# Patient Record
Sex: MALE | Race: WHITE | NOT HISPANIC OR LATINO | Employment: FULL TIME | URBAN - METROPOLITAN AREA
[De-identification: names, ages, dates, MRNs, and addresses within clinical notes are randomized per-mention and may not be internally consistent; named-entity substitution may affect disease eponyms.]

---

## 2018-02-13 ENCOUNTER — OFFICE VISIT (OUTPATIENT)
Dept: MEDICAL GROUP | Facility: MEDICAL CENTER | Age: 23
End: 2018-02-13
Payer: COMMERCIAL

## 2018-02-13 VITALS
HEIGHT: 68 IN | DIASTOLIC BLOOD PRESSURE: 74 MMHG | TEMPERATURE: 98.2 F | WEIGHT: 221 LBS | OXYGEN SATURATION: 100 % | SYSTOLIC BLOOD PRESSURE: 130 MMHG | HEART RATE: 45 BPM | BODY MASS INDEX: 33.49 KG/M2

## 2018-02-13 DIAGNOSIS — Z13.6 ENCOUNTER FOR LIPID SCREENING FOR CARDIOVASCULAR DISEASE: ICD-10-CM

## 2018-02-13 DIAGNOSIS — Z13.220 ENCOUNTER FOR LIPID SCREENING FOR CARDIOVASCULAR DISEASE: ICD-10-CM

## 2018-02-13 DIAGNOSIS — F41.1 GENERALIZED ANXIETY DISORDER: ICD-10-CM

## 2018-02-13 DIAGNOSIS — G40.409 MYOCLONUS EPILEPSY (HCC): ICD-10-CM

## 2018-02-13 DIAGNOSIS — R03.0 ELEVATED BP WITHOUT DIAGNOSIS OF HYPERTENSION: ICD-10-CM

## 2018-02-13 DIAGNOSIS — Z13.1 DIABETES MELLITUS SCREENING: ICD-10-CM

## 2018-02-13 DIAGNOSIS — Z76.89 ENCOUNTER TO ESTABLISH CARE WITH NEW DOCTOR: ICD-10-CM

## 2018-02-13 PROCEDURE — 99204 OFFICE O/P NEW MOD 45 MIN: CPT | Performed by: FAMILY MEDICINE

## 2018-02-13 RX ORDER — BACLOFEN 10 MG/1
10 TABLET ORAL 3 TIMES DAILY
Qty: 90 TAB | COMMUNITY
Start: 2018-02-13 | End: 2018-07-30

## 2018-02-13 ASSESSMENT — PATIENT HEALTH QUESTIONNAIRE - PHQ9: CLINICAL INTERPRETATION OF PHQ2 SCORE: 0

## 2018-02-13 NOTE — LETTER
"[employer] [facility]  Juve Portillo M.D.  [address]  [place] [address]  Fax: [phone]   Authorization for Release/Disclosure of   Protected Health Information   Name: YOAN SIDDIQI : 1995 SSN: [id number]   Address: [address] [place] [address]  [place] [address] Phone:    [phone] (home)    I authorize the entity listed below to release/disclose the PHI below to:   [facility]/Juve Portillo M.D. and Juve Portillo M.D.   Provider or Entity Name:  Dr. Mauro Montesinos   [facility]   Phone:  [phone]    Fax:     Reason for request: continuity of care   Information to be released:    [  ] LAST COLONOSCOPY,  including any PATH REPORT and follow-up  [  ] LAST FIT/COLOGUARD RESULT [  ] LAST DEXA  [  ] LAST MAMMOGRAM  [  ] LAST PAP  [  ] LAST LABS [  ] RETINA EXAM REPORT  [ X ] IMMUNIZATION RECORDS  [  ] Release all info      [  ] Check here and initial the line next to each item to release ALL health information INCLUDING  _____ Care and treatment for drug and / or alcohol abuse  _____ HIV testing, infection status, or AIDS  _____ Genetic Testing    DATES OF SERVICE OR TIME PERIOD TO BE DISCLOSED: _____________  I understand and acknowledge that:  * This Authorization may be revoked at any time by you in writing, except if your health information has already been used or disclosed.  * Your health information that will be used or disclosed as a result of you signing this authorization could be re-disclosed by the recipient. If this occurs, your re-disclosed health information may no longer be protected by State or Federal laws.  * You may refuse to sign this Authorization. Your refusal will not affect your ability to obtain treatment.  * This Authorization becomes effective upon signing and will  on (date) __________.      If no date is indicated, this Authorization will  one (1) year from the signature date.    Name: Yoan Lewis  Mike    Signature:   Date:     2/13/2018       PLEASE FAX REQUESTED RECORDS BACK TO: [phone]

## 2018-02-13 NOTE — ASSESSMENT & PLAN NOTE
Chronic, stable, well controlled off medication. Patient states that he has taken Wellbutrin in the past, and the last time that he saw psychiatrist was at the age of 9 years old. Patient does state that he has mild anxiety issues, however that these are generally well-controlled with stress relieving techniques.

## 2018-02-13 NOTE — PROGRESS NOTES
Subjective:   Chief Complaint/History of Present Illness:  Jaylen Mckeon is a 22 y.o. male patient new to [facility] who presents today to establish primary medical care and to discuss myoclonus epilepsy, GLADYS, elevated BP.  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 Patient states that this was preceded diagnosed by a neurologist. However, he is not clear whether this was by his neurologist at [place] (Dr. Lux) or a neurologist here in [place], Dr. Parker.    Patient states that episodes are infrequent, last one occurring more than 2 years ago, and that while they did trial him on his eyes, this caused him to have convulsions. It is unclear whether this was a worsening of his myoclonic episode secondary to a lowering of the seizure threshold with Xanax, versus a true medication allergy. In any case, we have updated his allergies at present time.    Furthermore, patient states that he was last prescribed baclofen by Dr. Parker, in order to address myalgias due to the myoclonic episodes.    ROS is NEGATIVE for confusion, altered mentation, word finding difficulty, memory loss, diplopia, visual scotomas, facial droop, dysarthria, dysphagia, hemiplegia, gait instability, new/abnormal paresthesias/numbness.    Generalized anxiety disorder  Chronic, stable, well controlled off medication. Patient states that he has taken Wellbutrin in the past, and the last time that he saw psychiatrist was at the age of 9 years old. Patient does state that he has mild anxiety issues, however that these are generally well-controlled with stress relieving techniques.    Elevated BP without diagnosis of hypertension  Patient is noted to have elevated blood pressure reading today in clinic with a systolic reading of 130 mmHg. Interestingly, patient has a really good/low pulse rate at 45,  without any syncopal presyncopal symptoms, dizziness, vertigo.    ROS is NEGATIVE for dizziness, generalized weakness/fatigue, cold sweats, dizziness,  vision/hearing changes, jaw pain/paresthesias, BUE pain/paresthesias/numbness/weakness, chest pain/pressure, palpitations, dyspnea, nausea, RUQ abdominal pain, oliguria/anuria, BLE edema.     ROS is NEGATIVE for confusion, altered mentation, word finding difficulty, memory loss, diplopia, visual scotomas, facial droop, dysarthria, dysphagia, hemiplegia, gait instability, new/abnormal paresthesias/numbness.      Patient Active Problem List    Diagnosis Date Noted   • Myoclonus epilepsy (CMS-HCC) 02/13/2018   • Generalized anxiety disorder 02/13/2018   • Elevated BP without diagnosis of hypertension 02/13/2018       Additional History:   Allergies:    Lorazepam and Amoxicillin-pot clavulanate     Medications:     Current Outpatient Prescriptions Ordered in [facility]   Medication Sig Dispense Refill   • baclofen (LIORESAL) 10 MG Tab Take 1 Tab by mouth 3 times a day. 90 Tab      No current Epic-ordered facility-administered medications on file.         Past Medical History:   No past medical history on file.     Past Surgical History:     Past Surgical History:   Procedure Laterality Date   • ORCHIOPEXY ADULT Bilateral     Testicular torsions        Social History:     Social History   Substance Use Topics   • Smoking status: Never Smoker   • Smokeless tobacco: Never Used   • Alcohol use No        Family History:     No family status information on file.      No family history on file.    ROS:     - Constitutional: Negative for fever, chills, unexpected weight change, and fatigue/generalized weakness.     - HEENT: Negative for headaches, vision changes, hearing changes, ear pain, ear discharge, rhinorrhea, sinus congestion, sore throat, and neck pain.      - Respiratory: Negative for cough, sputum production, chest congestion, dyspnea, wheezing, and crackles.      -  "Cardiovascular: Negative for chest pain, palpitations, orthopnea, and bilateral lower extremity edema.     - Gastrointestinal: Negative for heartburn, nausea, vomiting, abdominal pain, hematochezia, melena, diarrhea, constipation, and greasy/foul-smelling stools.     - Genitourinary: Negative for dysuria, polyuria, hematuria, pyuria, urinary urgency, and urinary incontinence.    - Musculoskeletal: Negative for myalgias, back pain, and joint pain.     - NOTE: All other systems reviewed and are negative, except as in HPI.     Objective:   Physical Exam:    Vitals: Blood pressure 130/74, pulse (!) 45, temperature 36.8 °C (98.2 °F), height 1.727 m (5' 8\"), weight 100.2 kg (221 lb), SpO2 100 %.   BMI: Body mass index is 33.6 kg/m².   General/Constitutional: Vitals as above, Well nourished, well developed male in no acute distress   Head/Eyes:  - Head is grossly normal & atraumatic  - Bilateral conjunctivae clear and not injected, bilateral EOMI, bilateral PERRL   ENT:   - Bilateral external ears grossly normal in appearance, external auditory canals clear & bilateral TMs visualized with appropriate cone of light reflex, hearing grossly intact  - External nares normal in appearance and without discharge/bleeding, bilateral turbinates non-erythematous/non-edematous and without discharge/bleeding  - Good dentition posterior oropharynx without erythema/edema/exudates  Neck: Neck supple, no masses, neck non-tender to palpation, no thyromegaly/goiter   Respiratory: No respiratory distress, bilateral lungs are clear to auscultation in all lung fields (anterior/lateral/posterior), no wheezing/rhonchi/rales   Cardiovascular: Regular rate and rhythm without murmur/gallops/rubs, distal pulses equal and 2+ bilaterally (radial, posterior tibial), no bilateral lower extremity edema   Gastrointestinal: Abdomen resonant to percussion, Bowel sounds present in all 4 quadrants, abdomen non-tender palpation    MSK: Gait grossly normal & not " antalgic, no tenderness to percussion of vertebral processes, no CVAT, no bilateral SI joint tenderness   Integumentary: No apparent rashes   Neuro: Gross motor movement intact in all 4 extremities, Gross sensation intact to extremities and trunk, Gait grossly normal and not antalgic   Psych: Judgment grossly appropriate, no apparent depression/anxiety    Health Maintenance:     - Not addressed at this visit    Imaging/Labs:     - Not addressed at this visit    Assessment and Plan:   1. Encounter to establish care with new doctor  New patient, in good health, apart from #2 & #3.   - HEMOGLOBIN A1C; Future   - COMP METABOLIC PANEL; Future   - LIPID PROFILE; Future    2. Myoclonus epilepsy (CMS-HCC)  Chronic, stable, well-controlled.  Baclofen When necessary for less myoclonic episode myalgias.   - baclofen (LIORESAL) 10 MG Tab; Take 1 Tab by mouth 3 times a day.  Dispense: 90 Tab   - COMP METABOLIC PANEL; Future    3. Generalized anxiety disorder  Chronic, stable, well controlled on stress relieving techniques.   - COMP METABOLIC PANEL; Future    4. Elevated BP without diagnosis of hypertension  Acute, new problem, patient to return to weeks to evaluate if this is actually a diagnosis of pre-hypertension versus hypertension. In the meantime, patient to have lab evaluation for possible comorbidities.   - HEMOGLOBIN A1C; Future   - COMP METABOLIC PANEL; Future   - LIPID PROFILE; Future    5. Encounter for lipid screening for cardiovascular disease  6. Diabetes mellitus screening   - HEMOGLOBIN A1C; Future   - COMP METABOLIC PANEL; Future   - LIPID PROFILE; Future    RTC: in 2 weeks for blood pressure management, lab review.    PLEASE NOTE: This dictation was created using voice recognition software. I have made every reasonable attempt to correct obvious errors, but I expect that there are errors of grammar and possibly content that I did not discover before finalizing the note.

## 2018-02-13 NOTE — ASSESSMENT & PLAN NOTE
Patient with history of myoclonic epilepsy, without loss of consciousness. Patient states that this was preceded diagnosed by a neurologist. However, he is not clear whether this was by his neurologist at [place] (Dr. Lxu) or a neurologist here in [place], Dr. Parker.    Patient states that episodes are infrequent, last one occurring more than 2 years ago, and that while they did trial him on his eyes, this caused him to have convulsions. It is unclear whether this was a worsening of his myoclonic episode secondary to a lowering of the seizure threshold with Xanax, versus a true medication allergy. In any case, we have updated his allergies at present time.    Furthermore, patient states that he was last prescribed baclofen by Dr. Parker, in order to address myalgias due to the myoclonic episodes.    ROS is NEGATIVE for confusion, altered mentation, word finding difficulty, memory loss, diplopia, visual scotomas, facial droop, dysarthria, dysphagia, hemiplegia, gait instability, new/abnormal paresthesias/numbness.

## 2018-02-13 NOTE — LETTER
[facility]  Juve Portillo M.D.  [address]  [place] [address]  Fax: [phone]   Authorization for Release/Disclosure of   Protected Health Information   Name: YOAN SIDDIQI : 1995 SSN: [id number]   Address: [address] [place] [address]  [place] [address] Phone:    [phone] (home)    I authorize the entity listed below to release/disclose the PHI below to:   [facility]/Juve Portillo M.D. and Juve Portillo M.D.   Provider or Entity Name:  Jennifer Jose J ([facility])   Address   City, State, [facility]   Phone:      Fax:     Reason for request: continuity of care   Information to be released:    [  ] LAST COLONOSCOPY,  including any PATH REPORT and follow-up  [  ] LAST FIT/COLOGUARD RESULT [  ] LAST DEXA  [  ] LAST MAMMOGRAM  [  ] LAST PAP  [  ] LAST LABS [  ] RETINA EXAM REPORT  [  ] IMMUNIZATION RECORDS  [  ] Release all info      [  ] Check here and initial the line next to each item to release ALL health information INCLUDING  _____ Care and treatment for drug and / or alcohol abuse  _____ HIV testing, infection status, or AIDS  _____ Genetic Testing    DATES OF SERVICE OR TIME PERIOD TO BE DISCLOSED: _____________  I understand and acknowledge that:  * This Authorization may be revoked at any time by you in writing, except if your health information has already been used or disclosed.  * Your health information that will be used or disclosed as a result of you signing this authorization could be re-disclosed by the recipient. If this occurs, your re-disclosed health information may no longer be protected by State or Federal laws.  * You may refuse to sign this Authorization. Your refusal will not affect your ability to obtain treatment.  * This Authorization becomes effective upon signing and will  on (date) __________.      If no date is indicated, this Authorization will  one (1) year from the signature date.    Name: Yoan Siddiqi    Signature:   Date:     2/13/2018       PLEASE FAX REQUESTED RECORDS BACK TO: [phone]

## 2018-02-13 NOTE — ASSESSMENT & PLAN NOTE
Patient is noted to have elevated blood pressure reading today in clinic with a systolic reading of 130 mmHg. Interestingly, patient has a really good/low pulse rate at 45, without any syncopal presyncopal symptoms, dizziness, vertigo.    ROS is NEGATIVE for dizziness, generalized weakness/fatigue, cold sweats, dizziness,  vision/hearing changes, jaw pain/paresthesias, BUE pain/paresthesias/numbness/weakness, chest pain/pressure, palpitations, dyspnea, nausea, RUQ abdominal pain, oliguria/anuria, BLE edema.     ROS is NEGATIVE for confusion, altered mentation, word finding difficulty, memory loss, diplopia, visual scotomas, facial droop, dysarthria, dysphagia, hemiplegia, gait instability, new/abnormal paresthesias/numbness.

## 2018-02-16 ENCOUNTER — APPOINTMENT (OUTPATIENT)
Dept: MEDICAL GROUP | Facility: MEDICAL CENTER | Age: 23
End: 2018-02-16
Payer: COMMERCIAL

## 2018-02-20 ENCOUNTER — APPOINTMENT (OUTPATIENT)
Dept: MEDICAL GROUP | Facility: MEDICAL CENTER | Age: 23
End: 2018-02-20

## 2018-02-21 ENCOUNTER — NON-PROVIDER VISIT (OUTPATIENT)
Dept: MEDICAL GROUP | Facility: MEDICAL CENTER | Age: 23
End: 2018-02-21
Payer: COMMERCIAL

## 2018-02-21 DIAGNOSIS — Z11.1 VISIT FOR TB SKIN TEST: ICD-10-CM

## 2018-02-21 DIAGNOSIS — Z23 NEED FOR TDAP VACCINATION: ICD-10-CM

## 2018-02-22 ENCOUNTER — TELEPHONE (OUTPATIENT)
Dept: MEDICAL GROUP | Facility: MEDICAL CENTER | Age: 23
End: 2018-02-22

## 2018-02-22 PROCEDURE — 86580 TB INTRADERMAL TEST: CPT | Performed by: FAMILY MEDICINE

## 2018-02-22 PROCEDURE — 90715 TDAP VACCINE 7 YRS/> IM: CPT | Performed by: FAMILY MEDICINE

## 2018-02-22 PROCEDURE — 90471 IMMUNIZATION ADMIN: CPT | Performed by: FAMILY MEDICINE

## 2018-02-22 NOTE — TELEPHONE ENCOUNTER
Pt was told that he can go to any  to get his TB skin test read since he got his PPD placed on Wednesday 02/21/18 at 5:10pm. Pt's TB form is scanned in [employer]. Please do not forget to enter the result on the chart.

## 2018-02-23 ENCOUNTER — TELEPHONE (OUTPATIENT)
Dept: MEDICAL GROUP | Facility: MEDICAL CENTER | Age: 23
End: 2018-02-23

## 2018-02-23 NOTE — TELEPHONE ENCOUNTER
ESTABLISHED PATIENT PRE-VISIT PLANNING     Note: Patient will not be contacted if there is no indication to call.     1.  Reviewed notes from the last few office visits within the medical group: Yes 02/13/2018    2.  If any orders were placed at last visit or intended to be done for this visit (i.e. 6 mos follow-up), do we have Results/Consult Notes?        •  Labs - Labs ordered, NOT completed. Patient advised to complete prior to next appointment.   Note: If patient appointment is for lab review and patient did not complete labs, check with provider if OK to reschedule patient until labs completed.       •  Imaging - Imaging was not ordered at last office visit.       •  Referrals - No referrals were ordered at last office visit.    3. Is this appointment scheduled as a Hospital Follow-Up? No    4.  Immunizations were updated in [employer] using WebIZ?: Yes       •  Web [employer] Recommendations: FLU, MMR  and VARICELLA (Chicken Pox)     5.  Patient is due for the following Health Maintenance Topics:   Health Maintenance Due   Topic Date Due   • IMM HEP B VACCINE (1 of 3 - Primary Series) 1995   • IMM HEP A VACCINE (1 of 2 - Standard Series) 12/10/1995   • IMM VARICELLA (CHICKENPOX) VACCINE (1 of 2 - 2 Dose Adolescent Series) 12/10/2007   • IMM INFLUENZA (1) 09/01/2017         6.  Patient was NOT informed to arrive 15 min prior to their scheduled appointment and bring in their medication bottles.

## 2018-02-24 ENCOUNTER — HOSPITAL ENCOUNTER (OUTPATIENT)
Dept: LAB | Facility: MEDICAL CENTER | Age: 23
End: 2018-02-24
Attending: FAMILY MEDICINE
Payer: COMMERCIAL

## 2018-02-24 DIAGNOSIS — G40.409 MYOCLONUS EPILEPSY (HCC): ICD-10-CM

## 2018-02-24 DIAGNOSIS — Z13.1 DIABETES MELLITUS SCREENING: ICD-10-CM

## 2018-02-24 DIAGNOSIS — Z76.89 ENCOUNTER TO ESTABLISH CARE WITH NEW DOCTOR: ICD-10-CM

## 2018-02-24 DIAGNOSIS — R03.0 ELEVATED BP WITHOUT DIAGNOSIS OF HYPERTENSION: ICD-10-CM

## 2018-02-24 DIAGNOSIS — F41.1 GENERALIZED ANXIETY DISORDER: ICD-10-CM

## 2018-02-24 DIAGNOSIS — Z13.220 ENCOUNTER FOR LIPID SCREENING FOR CARDIOVASCULAR DISEASE: ICD-10-CM

## 2018-02-24 DIAGNOSIS — Z13.6 ENCOUNTER FOR LIPID SCREENING FOR CARDIOVASCULAR DISEASE: ICD-10-CM

## 2018-02-24 LAB
ALBUMIN SERPL BCP-MCNC: 4.7 G/DL (ref 3.2–4.9)
ALBUMIN/GLOB SERPL: 1.9 G/DL
ALP SERPL-CCNC: 53 U/L (ref 30–99)
ALT SERPL-CCNC: 30 U/L (ref 2–50)
ANION GAP SERPL CALC-SCNC: 7 MMOL/L (ref 0–11.9)
AST SERPL-CCNC: 23 U/L (ref 12–45)
BILIRUB SERPL-MCNC: 0.8 MG/DL (ref 0.1–1.5)
BUN SERPL-MCNC: 17 MG/DL (ref 8–22)
CALCIUM SERPL-MCNC: 9.4 MG/DL (ref 8.5–10.5)
CHLORIDE SERPL-SCNC: 105 MMOL/L (ref 96–112)
CHOLEST SERPL-MCNC: 141 MG/DL (ref 100–199)
CO2 SERPL-SCNC: 27 MMOL/L (ref 20–33)
CREAT SERPL-MCNC: 0.95 MG/DL (ref 0.5–1.4)
EST. AVERAGE GLUCOSE BLD GHB EST-MCNC: 108 MG/DL
GLOBULIN SER CALC-MCNC: 2.5 G/DL (ref 1.9–3.5)
GLUCOSE SERPL-MCNC: 91 MG/DL (ref 65–99)
HBA1C MFR BLD: 5.4 % (ref 0–5.6)
HDLC SERPL-MCNC: 40 MG/DL
LDLC SERPL CALC-MCNC: 93 MG/DL
POTASSIUM SERPL-SCNC: 4.3 MMOL/L (ref 3.6–5.5)
PROT SERPL-MCNC: 7.2 G/DL (ref 6–8.2)
SODIUM SERPL-SCNC: 139 MMOL/L (ref 135–145)
TRIGL SERPL-MCNC: 42 MG/DL (ref 0–149)

## 2018-02-24 PROCEDURE — 80061 LIPID PANEL: CPT

## 2018-02-24 PROCEDURE — 83036 HEMOGLOBIN GLYCOSYLATED A1C: CPT

## 2018-02-24 PROCEDURE — 80053 COMPREHEN METABOLIC PANEL: CPT

## 2018-02-24 PROCEDURE — 36415 COLL VENOUS BLD VENIPUNCTURE: CPT

## 2018-02-27 ENCOUNTER — APPOINTMENT (OUTPATIENT)
Dept: MEDICAL GROUP | Facility: MEDICAL CENTER | Age: 23
End: 2018-02-27
Payer: COMMERCIAL

## 2018-02-28 ENCOUNTER — NON-PROVIDER VISIT (OUTPATIENT)
Dept: MEDICAL GROUP | Facility: MEDICAL CENTER | Age: 23
End: 2018-02-28

## 2018-02-28 DIAGNOSIS — Z11.1 PPD SCREENING TEST: ICD-10-CM

## 2018-02-28 PROCEDURE — 86580 TB INTRADERMAL TEST: CPT | Performed by: FAMILY MEDICINE

## 2018-02-28 NOTE — NON-PROVIDER
Jaylen Mckeon is a 22 y.o. male here for a non-provider visit for PPD placement -- Step 1 of 1    Reason for PPD:  school requirement    1. TB evaluation questionnaire completed by patient? Yes      -  If any answers marked yes did you contact a provider prior to placing? Not Indicated  2.  Patient notified to return to clinic for reading on: 03/02/18/ at 08:30am Friday or 03/03/18 at 08:30am Saturday ()  3.  PPD Placement documentation completed on TB evaluation questionnaire? Yes  4.  Location of TB evaluation questionnaire filed:     This is a repeat PPD. First PPD placement on 02/22/18 was voided because pt did not come back for the PPD reading on a timely manner.

## 2018-03-02 ENCOUNTER — NON-PROVIDER VISIT (OUTPATIENT)
Dept: MEDICAL GROUP | Facility: MEDICAL CENTER | Age: 23
End: 2018-03-02
Payer: COMMERCIAL

## 2018-03-02 ENCOUNTER — APPOINTMENT (OUTPATIENT)
Dept: MEDICAL GROUP | Facility: MEDICAL CENTER | Age: 23
End: 2018-03-02
Payer: COMMERCIAL

## 2018-03-02 LAB — TB WHEAL 3D P 5 TU DIAM: NEGATIVE MM

## 2018-03-02 NOTE — PROGRESS NOTES
Jaylen Mckeon is a 22 y.o. male here for a non-provider visit for PPD reading -- Step 2 of 2.      1.  Resulted in Epic under enter/edit results? Yes   2.  TB evaluation questionnaire scanned into chart and original given to patient?Yes      3. Was induration greater than 0 mm? No.      Routed to PCP? Yes

## 2018-07-30 ENCOUNTER — OFFICE VISIT (OUTPATIENT)
Dept: MEDICAL GROUP | Facility: MEDICAL CENTER | Age: 23
End: 2018-07-30
Payer: COMMERCIAL

## 2018-07-30 VITALS
TEMPERATURE: 98.5 F | OXYGEN SATURATION: 98 % | WEIGHT: 211.2 LBS | SYSTOLIC BLOOD PRESSURE: 122 MMHG | HEART RATE: 74 BPM | BODY MASS INDEX: 32.01 KG/M2 | DIASTOLIC BLOOD PRESSURE: 50 MMHG | HEIGHT: 68 IN

## 2018-07-30 DIAGNOSIS — M50.20 CERVICAL DISC HERNIATION: ICD-10-CM

## 2018-07-30 DIAGNOSIS — G47.00 INSOMNIA, UNSPECIFIED TYPE: ICD-10-CM

## 2018-07-30 DIAGNOSIS — E66.9 OBESITY (BMI 30.0-34.9): ICD-10-CM

## 2018-07-30 DIAGNOSIS — R53.83 OTHER FATIGUE: ICD-10-CM

## 2018-07-30 PROBLEM — R03.0 ELEVATED BP WITHOUT DIAGNOSIS OF HYPERTENSION: Status: RESOLVED | Noted: 2018-02-13 | Resolved: 2018-07-30

## 2018-07-30 PROBLEM — E66.811 OBESITY (BMI 30.0-34.9): Status: ACTIVE | Noted: 2018-07-30

## 2018-07-30 PROCEDURE — 99214 OFFICE O/P EST MOD 30 MIN: CPT | Performed by: FAMILY MEDICINE

## 2018-07-30 RX ORDER — BACLOFEN 10 MG/1
TABLET ORAL
COMMUNITY
End: 2018-07-30

## 2018-07-30 RX ORDER — BUPROPION HYDROCHLORIDE 150 MG/1
TABLET, EXTENDED RELEASE ORAL
COMMUNITY
Start: 2009-08-26 | End: 2018-07-30

## 2018-07-30 RX ORDER — BUSPIRONE HYDROCHLORIDE 15 MG/1
TABLET ORAL
COMMUNITY
Start: 2009-08-26 | End: 2018-07-30

## 2018-07-30 RX ORDER — BACLOFEN 10 MG/1
TABLET ORAL
COMMUNITY
Start: 2016-03-16 | End: 2018-07-30
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
## 2018-07-30 NOTE — LETTER
[facility]  Juve Portillo M.D.  [address]  [facility] [address]  Fax: [phone]   Authorization for Release/Disclosure of   Protected Health Information   Name: YOAN SIDDIQI : 1995 SSN: [id number]   Address: [address] [place] [address] [place] [address] Phone:    [phone] (home)    I authorize the entity listed below to release/disclose the PHI below to:   [facility]/Juve Portillo M.D. and Juve Portillo M.D.   Provider or Entity Name:     Address [address]  [facility], [place] [address]   Phone:[phone]      Fax:     Reason for request: continuity of care   Information to be released:    [  ] LAST COLONOSCOPY,  including any PATH REPORT and follow-up  [  ] LAST FIT/COLOGUARD RESULT [  ] LAST DEXA  [  ] LAST MAMMOGRAM  [  ] LAST PAP  [  ] LAST LABS [  ] RETINA EXAM REPORT  [  ] IMMUNIZATION RECORDS  [  ] Release all info      [  ] Check here and initial the line next to each item to release ALL health information INCLUDING  _____ Care and treatment for drug and / or alcohol abuse  _____ HIV testing, infection status, or AIDS  _____ Genetic Testing    DATES OF SERVICE OR TIME PERIOD TO BE DISCLOSED: _____________  I understand and acknowledge that:  * This Authorization may be revoked at any time by you in writing, except if your health information has already been used or disclosed.  * Your health information that will be used or disclosed as a result of you signing this authorization could be re-disclosed by the recipient. If this occurs, your re-disclosed health information may no longer be protected by State or Federal laws.  * You may refuse to sign this Authorization. Your refusal will not affect your ability to obtain treatment.  * This Authorization becomes effective upon signing and will  on (date) __________.      If no date is indicated, this Authorization will  one (1) year from the signature date.    Name: Yoan Lewis  Mike    Signature:   Date:     7/30/2018       PLEASE FAX REQUESTED RECORDS BACK TO: [phone]

## 2018-08-03 ENCOUNTER — HOSPITAL ENCOUNTER (OUTPATIENT)
Dept: LAB | Facility: MEDICAL CENTER | Age: 23
End: 2018-08-03
Attending: FAMILY MEDICINE
Payer: COMMERCIAL

## 2018-08-03 DIAGNOSIS — R53.83 OTHER FATIGUE: ICD-10-CM

## 2018-08-03 DIAGNOSIS — G47.00 INSOMNIA, UNSPECIFIED TYPE: ICD-10-CM

## 2018-08-03 LAB
25(OH)D3 SERPL-MCNC: 25 NG/ML (ref 30–100)
BASOPHILS # BLD AUTO: 1 % (ref 0–1.8)
BASOPHILS # BLD: 0.06 K/UL (ref 0–0.12)
EOSINOPHIL # BLD AUTO: 0.87 K/UL (ref 0–0.51)
EOSINOPHIL NFR BLD: 14.4 % (ref 0–6.9)
ERYTHROCYTE [DISTWIDTH] IN BLOOD BY AUTOMATED COUNT: 39.4 FL (ref 35.9–50)
FERRITIN SERPL-MCNC: 90.2 NG/ML (ref 22–322)
FOLATE SERPL-MCNC: 15.4 NG/ML
HCT VFR BLD AUTO: 46.6 % (ref 42–52)
HGB BLD-MCNC: 16.6 G/DL (ref 14–18)
IMM GRANULOCYTES # BLD AUTO: 0.02 K/UL (ref 0–0.11)
IMM GRANULOCYTES NFR BLD AUTO: 0.3 % (ref 0–0.9)
IRON SATN MFR SERPL: 43 % (ref 15–55)
IRON SERPL-MCNC: 164 UG/DL (ref 50–180)
LYMPHOCYTES # BLD AUTO: 2.05 K/UL (ref 1–4.8)
LYMPHOCYTES NFR BLD: 33.8 % (ref 22–41)
MCH RBC QN AUTO: 31 PG (ref 27–33)
MCHC RBC AUTO-ENTMCNC: 35.6 G/DL (ref 33.7–35.3)
MCV RBC AUTO: 86.9 FL (ref 81.4–97.8)
MONOCYTES # BLD AUTO: 0.48 K/UL (ref 0–0.85)
MONOCYTES NFR BLD AUTO: 7.9 % (ref 0–13.4)
NEUTROPHILS # BLD AUTO: 2.58 K/UL (ref 1.82–7.42)
NEUTROPHILS NFR BLD: 42.6 % (ref 44–72)
NRBC # BLD AUTO: 0 K/UL
NRBC BLD-RTO: 0 /100 WBC
PLATELET # BLD AUTO: 191 K/UL (ref 164–446)
PMV BLD AUTO: 10.2 FL (ref 9–12.9)
RBC # BLD AUTO: 5.36 M/UL (ref 4.7–6.1)
TIBC SERPL-MCNC: 381 UG/DL (ref 250–450)
TSH SERPL DL<=0.005 MIU/L-ACNC: 2.31 UIU/ML (ref 0.38–5.33)
VIT B12 SERPL-MCNC: 246 PG/ML (ref 211–911)
WBC # BLD AUTO: 6.1 K/UL (ref 4.8–10.8)

## 2018-08-03 PROCEDURE — 84403 ASSAY OF TOTAL TESTOSTERONE: CPT

## 2018-08-03 PROCEDURE — 83540 ASSAY OF IRON: CPT

## 2018-08-03 PROCEDURE — 82728 ASSAY OF FERRITIN: CPT

## 2018-08-03 PROCEDURE — 82746 ASSAY OF FOLIC ACID SERUM: CPT

## 2018-08-03 PROCEDURE — 84443 ASSAY THYROID STIM HORMONE: CPT

## 2018-08-03 PROCEDURE — 36415 COLL VENOUS BLD VENIPUNCTURE: CPT

## 2018-08-03 PROCEDURE — 82306 VITAMIN D 25 HYDROXY: CPT

## 2018-08-03 PROCEDURE — 83550 IRON BINDING TEST: CPT

## 2018-08-03 PROCEDURE — 85025 COMPLETE CBC W/AUTO DIFF WBC: CPT

## 2018-08-03 PROCEDURE — 84270 ASSAY OF SEX HORMONE GLOBUL: CPT

## 2018-08-03 PROCEDURE — 82607 VITAMIN B-12: CPT

## 2018-08-03 NOTE — ASSESSMENT & PLAN NOTE
Chronic, uncontrolled, please see notes from same date of service 7/30/2018 regarding other fatigue.
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
## 2018-08-03 NOTE — ASSESSMENT & PLAN NOTE
Chronic problem, stable, the patient states that this precludes his ability to enroll in the .  Patient can have intermittent pain that radiates in his back or down to his arms when he has worsening pain of his neck.  However, this has not increased progressively over time.

## 2018-08-03 NOTE — ASSESSMENT & PLAN NOTE
Chronic, uncontrolled, but improving.  Patient has lost ~10lbs since his last visit.    ROS is NEGATIVE for: cold or heat intolerance, anxiety/depression, chest pain/pressure, palpitations, hair thickening/coarsening/falling out/thinning, skin changes, diarrhea/constipation, unexpected weight change.    ROS is NEGATIVE for blurred vision, polydipsia, polyuria, diaphoresis, palpitations, fatigue, irritability, flank pain, BLE paresthesias.

## 2018-08-04 LAB
SHBG SERPL-SCNC: 36 NMOL/L (ref 11–80)
TESTOST FREE MFR SERPL: 1.9 % (ref 1.6–2.9)
TESTOST FREE SERPL-MCNC: 119 PG/ML (ref 47–244)
TESTOST SERPL-MCNC: 635 NG/DL (ref 300–1080)

## 2018-08-08 PROBLEM — E55.9 VITAMIN D DEFICIENCY: Status: ACTIVE | Noted: 2018-08-08

## 2018-09-07 ENCOUNTER — TELEPHONE (OUTPATIENT)
Dept: SLEEP MEDICINE | Facility: MEDICAL CENTER | Age: 23
End: 2018-09-07

## 2018-09-07 ENCOUNTER — SLEEP CENTER VISIT (OUTPATIENT)
Dept: SLEEP MEDICINE | Facility: MEDICAL CENTER | Age: 23
End: 2018-09-07
Payer: COMMERCIAL

## 2018-09-07 VITALS
HEIGHT: 69 IN | OXYGEN SATURATION: 97 % | DIASTOLIC BLOOD PRESSURE: 72 MMHG | SYSTOLIC BLOOD PRESSURE: 120 MMHG | BODY MASS INDEX: 31.55 KG/M2 | RESPIRATION RATE: 16 BRPM | WEIGHT: 213 LBS | HEART RATE: 46 BPM

## 2018-09-07 DIAGNOSIS — G47.30 SLEEP DISORDER BREATHING: ICD-10-CM

## 2018-09-07 DIAGNOSIS — G25.81 RLS (RESTLESS LEGS SYNDROME): ICD-10-CM

## 2018-09-07 DIAGNOSIS — E83.10 IRON METABOLISM DISEASE: ICD-10-CM

## 2018-09-07 DIAGNOSIS — E66.9 OBESITY (BMI 30.0-34.9): ICD-10-CM

## 2018-09-07 PROCEDURE — 99244 OFF/OP CNSLTJ NEW/EST MOD 40: CPT | Performed by: FAMILY MEDICINE

## 2018-09-07 NOTE — PATIENT INSTRUCTIONS
Stimulus control (Ref: UpToDate)    Patients with insomnia may associate their bed and bedroom with the fear of not sleeping or other arousing events, rather than the more pleasurable anticipation of sleep. The longer one stays in bed trying to sleep, the stronger the association becomes. This perpetuates the difficulty falling asleep.Stimulus control therapy is a strategy whose purpose is to disrupt this association by enhancing the likelihood of sleep . Patients should not go to bed until they are sleepy and should use the bed primarily for sleep (and not for reading, watching television, eating, or worrying). They should not spend more than 20 minutes in bed awake. If they are awake after 20 minutes, they should leave the bedroom and engage in a relaxing activity, such as reading or listening to soothing music. Patients should not engage in activities that stimulate them or reward them for being awake in the middle of the night, such as eating or watching television. In addition, they should not return to bed until they are tired and feel ready to sleep. If they return to bed and still cannot sleep within 20 minutes, the process should be repeated. An alarm should be set to wake the patient at the same time every morning, including weekends. Daytime naps are not allowed.    .  History   Sexual Activity   • Sexual activity: Yes   • Partners: Female   • Birth control/ protection: Pill   Sleep hygiene (ref: UpToDate)  ?Sleep as long as necessary to feel rested (usually seven to eight hours for adults) and then get out of bed  ?Maintain a regular sleep schedule, particularly a regular wake-up time in the morning  ?Try not to force sleep  ?Avoid caffeinated beverages after lunch  ?Avoid alcohol near bedtime (eg, late afternoon and evening)  ?Avoid smoking or other nicotine intake, particularly during the evening  ?Adjust the bedroom environment as needed to decrease stimuli (eg, reduce ambient light, turn off the  "television or radio)  ?Avoid use of light-emitting screens  (laptops, tablets, smartphones, ebooks) 2 hours before bedtime   ?Resolve concerns or worries before bedtime  ?Exercise regularly for at least 20 minutes, preferably more than four to five hours prior to bedtime.  ?Avoid daytime naps, especially if they are longer than 20 to 30 minutes or occur late in the day  BOOKS; \"Say Rodriguez to Insomnia\" by Scotty Mendoza OR \"No More Sleepless Nights\" by Jake Rangel    Restless Legs Syndrome  Introduction  Restless legs syndrome is a condition that causes uncomfortable feelings or sensations in the legs, especially while sitting or lying down. The sensations usually cause an overwhelming urge to move the legs. The arms can also sometimes be affected.  The condition can range from mild to severe. The symptoms often interfere with a person's ability to sleep.  What are the causes?  The cause of this condition is not known.  What increases the risk?  This condition is more likely to develop in:  · People who are older than age 50.  · Pregnant women. In general, restless legs syndrome is more common in women than in men.  · People who have a family history of the condition.  · People who have certain medical conditions, such as iron deficiency, kidney disease, Parkinson disease, or nerve damage.  · People who take certain medicines, such as medicines for high blood pressure, nausea, colds, allergies, depression, and some heart conditions.  What are the signs or symptoms?  The main symptom of this condition is uncomfortable sensations in the legs. These sensations may be:  · Described as pulling, tingling, prickling, throbbing, crawling, or burning.  · Worse while you are sitting or lying down.  · Worse during periods of rest or inactivity.  · Worse at night, often interfering with your sleep.  · Accompanied by a very strong urge to move your legs.  · Temporarily relieved by movement of your legs.  The sensations usually " affect both sides of the body. The arms can also be affected, but this is rare. People who have this condition often have tiredness during the day because of their lack of sleep at night.  How is this diagnosed?  This condition may be diagnosed based on your description of the symptoms. You may also have tests, including blood tests, to check for other conditions that may lead to your symptoms. In some cases, you may be asked to spend some time in a sleep lab so your sleeping can be monitored.  How is this treated?  Treatment for this condition is focused on managing the symptoms. Treatment may include:  · Self-help and lifestyle changes.  · Medicines.  Follow these instructions at home:  · Take medicines only as directed by your health care provider.  · Try these methods to get temporary relief from the uncomfortable sensations:  ¨ Massage your legs.  ¨ Walk or stretch.  ¨ Take a cold or hot bath.  · Practice good sleep habits. For example, go to bed and get up at the same time every day.  · Exercise regularly.  · Practice ways of relaxing, such as yoga or meditation.  · Avoid caffeine and alcohol.  · Do not use any tobacco products, including cigarettes, chewing tobacco, or electronic cigarettes. If you need help quitting, ask your health care provider.  · Keep all follow-up visits as directed by your health care provider. This is important.  Contact a health care provider if:  Your symptoms do not improve with treatment, or they get worse.  This information is not intended to replace advice given to you by your health care provider. Make sure you discuss any questions you have with your health care provider.  Document Released: 12/08/2003 Document Revised: 05/25/2017 Document Reviewed: 12/14/2015  © 2017 [employer]

## 2018-09-07 NOTE — PROGRESS NOTES
"     [facility]  Consult Note     Date: 9/7/2018 / Time: 8:34 AM    Patient ID:   Name:             Jaylen Mckeon   YOB: 1995  Age:                 22 y.o.  male   MRN:               [id number]      Thank you for requesting a sleep medicine consultation on Jaylen Mckeon at the sleep center. He presents today with the chief complaints of occasional excessive daytime sleepiness. He is referred by Dr. Portillo for evaluation and treatment of sleep disorder breathing and non restorative sleep.     HISTORY OF PRESENT ILLNESS:       At night,  Jaylen Mckeon goes to bed around 10 pm on weekdays and on weekends. He gets out of bed at 5-6 am on weekdays and at on weekends.  He  averages 7-8 hrs of sleep on a good night and 2-3 hrs on a bad night. Pt has bad nights 1-2 nights per week. He falls asleep within 15 minutes. He awakens 3-4 times a night due to no obvious reason. It takes him few min to hour to fall back asleep. During that time He lies in bed.  He  Does not use the bed only for sleeping. Also, during that time  He  thinks about not getting enough sleep.      He is not aware of snoring/witnessed apneas/gasping or choking in sleep.  He has symptoms of restless legs syndrome such as an \"urge to move\"  He  legs in the evening or bedtime. He thinks the symptoms have been going on for since age 7 years old. The symptoms usually starts after 6 pm and mostly at bed time. As per pt it does interfere with sleep onset and occassionally interfere with sleep maintenance. He  denies any symptoms of narcolepsy such as sleep paralysis or cataplexy, or any symptoms to suggest parasomnias such as sleep walking or acting out of dreams. He uses mariajuana for anxiety about 3-4 times per week before bed..    Overall,  He doesnot finds his sleep refreshing. When He  wakes up in the morning, He does feel tired. In terms of  excessive daytime sleepiness,  He complains of sleepiness while  at work, " "while  watching TV, however denies while driving. McDonald sleepiness scale score is normal at  6/24.   He  Does take regular naps. The naps are usually 15-20 min long.      REVIEW OF SYSTEMS:       Constitutional: Denies fevers, Denies weight changes  Eyes: Denies changes in vision, no eye pain  Ears/Nose/Throat/Mouth: Denies nasal congestion or sore throat   Cardiovascular: Denies chest pain or palpitations   Respiratory: Denies shortness of breath , Denies cough  Gastrointestinal/Hepatic: Denies abdominal pain, nausea, vomiting, diarrhea, constipation or GI bleeding   Genitourinary: Denies bladder dysfunction, dysuria or frequency  Musculoskeletal/Rheum: Denies  joint pain and swelling   Neurological: Denies headache, confusion,   Psychiatric: denies mood disorder   Sleep: non restorative sleep,     Comprehensive review of systems form is reviewed with the patient and is attached in the EMR.     PMH:  has no past medical history on file.  MEDS: No current outpatient prescriptions on file.  ALLERGIES:   Allergies   Allergen Reactions   • Ativan      Other reaction(s): Other   • Augmentin Hives   • Lorazepam Unspecified     Convulsions   • Amoxicillin-Pot Clavulanate Hives and Rash     Moderate to severe, as an infant     SURGHX:   Past Surgical History:   Procedure Laterality Date   • ORCHIOPEXY ADULT Bilateral     Testicular torsions     SOCHX:  reports that he has never smoked. He has never used smokeless tobacco. He reports that he does not drink alcohol or use drugs..   FH:   Family History   Problem Relation Age of Onset   • Sleep Apnea Neg Hx            Physical Exam:  Vitals/ General Appearance:   Weight/BMI: Body mass index is 31.45 kg/m².  Blood pressure 120/72, pulse (!) 46, resp. rate 16, height 1.753 m (5' 9\"), weight 96.6 kg (213 lb), SpO2 97 %.  Vitals:    09/07/18 0816   BP: 120/72   Pulse: (!) 46   Resp: 16   SpO2: 97%   Weight: 96.6 kg (213 lb)   Height: 1.753 m (5' 9\")       Pt. is alert and " oriented to time, place and person. Cooperative and in no apparent distress.       1. Head: Atraumatic, normocephalic.   2. Ears: Normal tympanic membrane and no discharge  3. Nose: No inferior turbinate hypertophy   4. Throat: Oropharynx appears crowded in that the palate is overhanging (Malam Mary Kate scale 3. Tonsils are not enlarged, uvula is n large, pharynx not inflamed. Tongue is large. has intact dentition and oral hygiene is fair.  5. Neck: Supple. No thyromegaly  6. Chest: Trachea central  7. Lungs auscultation: B/L good air entry, vesicular breath sounds, no adventitious sounds  8. Heart auscultation: 1st and 2nd heart sounds normal, regular rhythm. No appreciable murmur.  9. . Extremities:  no pedal edema.   Peripheral pulses felt.  10. Skin: No rash  11. NEUROLOGICAL EXAMINATION: On neurological exam, the patient was alert and oriented x3. speech was clear and fluent without dysarthria.   INVESTIGATIONS:       ASSESSMENT AND PLAN     1. He  has symptoms of sleep disorder breathing. He  has excessive daytime sleepiness        The pathophysiology of MARIO ALBERTO and the increased risk of cardiovascular morbidity from untreated MARIO ALBERTO is discussed in detail with the patient.      We have discussed diagnostic options including in-laboratory, attended polysomnography and home sleep testing. We have also discussed treatment options including airway pressurization, reconstructive otolaryngologic surgery, dental appliances and weight management.       Subsequently,treatment options will be discussed based on the diagnostic study. Meanwhile, He is urged to avoid supine sleep, weight gain and alcoholic beverages since all of these can worsen MARIO ALBERTO. He is cautioned against drowsy driving. If He feels sleepy while driving, He must pull over for a break/nap, rather than persist on the road, in the interest of He own safety and that of others on the road.    Plan  -  He  will be scheduled for an overnight PSG to assess sleep related   breathing disorder.    2. Restless Leg syndrome: he has hx of RLS and currently not on medication. It does interfere with sleep onset few times a week however denies issues with sleep maintanance. Denies hx of anemia. RLS could be primary or secondary in nature. RLS can be exacerbated in light of sleep related breathing disorder. Other common cause of RLS is low ferritin level, which were ordered today. We will reassess RLS symptoms once MARIO ALBERTO is treated and well controlled. Medication options were discussed however deferred on this visit.         3. Regarding treatment of other past medical problems and general health maintenance,  He is urged to follow up with PCP.    Patient's body mass index is 31.45 kg/m². Exercise and nutrition counseling were performed at this visit.

## 2018-09-07 NOTE — LETTER
"   Josh Chapman M.D.  [facility]   [address]   [place] [address]  Phone: [phone] - Fax: [phone]           Encounter Date: 9/7/2018  Provider: Josh Chapman M.D.  Location of Care: [facility]      Patient:   Jaylen Mckeon   MR Number: [id number]   YOB: 1995     PROGRESS NOTE:       [facility]  Consult Note     Date: 9/7/2018 / Time: 8:34 AM    Patient ID:   Name:             Jaylen Mckeon   YOB: 1995  Age:                 22 y.o.  male   MRN:               [id number]      Thank you for requesting a sleep medicine consultation on Jaylen Mckeon at the sleep center. He presents today with the chief complaints of occasional excessive daytime sleepiness. He is referred by Dr. Portillo for evaluation and treatment of sleep disorder breathing and non restorative sleep.     HISTORY OF PRESENT ILLNESS:       At night,  Jaylen Mckeon goes to bed around 10 pm on weekdays and on weekends. He gets out of bed at 5-6 am on weekdays and at on weekends.  He  averages 7-8 hrs of sleep on a good night and 2-3 hrs on a bad night. Pt has bad nights 1-2 nights per week. He falls asleep within 15 minutes. He awakens 3-4 times a night due to no obvious reason. It takes him few min to hour to fall back asleep. During that time He lies in bed.  He  Does not use the bed only for sleeping. Also, during that time  He  thinks about not getting enough sleep.      He is not aware of snoring/witnessed apneas/gasping or choking in sleep.  He has symptoms of restless legs syndrome such as an \"urge to move\"  He  legs in the evening or bedtime. He thinks the symptoms have been going on for since age 7 years old. The symptoms usually starts after 6 pm and mostly at bed time. As per pt it does interfere with sleep onset and occassionally interfere with sleep maintenance. He  denies any symptoms of " narcolepsy such as sleep paralysis or cataplexy, or any symptoms to suggest parasomnias such as sleep walking or acting out of dreams. He uses mariajuana for anxiety about 3-4 times per week before bed..    Overall,  He doesnot finds his sleep refreshing. When He  wakes up in the morning, He does feel tired. In terms of  excessive daytime sleepiness,  He complains of sleepiness while  at work, while  watching TV, however denies while driving. Saxe sleepiness scale score is normal at  6/24.   He  Does take regular naps. The naps are usually 15-20 min long.      REVIEW OF SYSTEMS:       Constitutional: Denies fevers, Denies weight changes  Eyes: Denies changes in vision, no eye pain  Ears/Nose/Throat/Mouth: Denies nasal congestion or sore throat   Cardiovascular: Denies chest pain or palpitations   Respiratory: Denies shortness of breath , Denies cough  Gastrointestinal/Hepatic: Denies abdominal pain, nausea, vomiting, diarrhea, constipation or GI bleeding   Genitourinary: Denies bladder dysfunction, dysuria or frequency  Musculoskeletal/Rheum: Denies  joint pain and swelling   Neurological: Denies headache, confusion,   Psychiatric: denies mood disorder   Sleep: non restorative sleep,     Comprehensive review of systems form is reviewed with the patient and is attached in the EMR.     PMH:  has no past medical history on file.  MEDS: No current outpatient prescriptions on file.  ALLERGIES:   Allergies   Allergen Reactions   • Ativan      Other reaction(s): Other   • Augmentin Hives   • Lorazepam Unspecified     Convulsions   • Amoxicillin-Pot Clavulanate Hives and Rash     Moderate to severe, as an infant     SURGHX:   Past Surgical History:   Procedure Laterality Date   • ORCHIOPEXY ADULT Bilateral     Testicular torsions     SOCHX:  reports that he has never smoked. He has never used smokeless tobacco. He reports that he does not drink alcohol or use drugs..   FH:   Family History   Problem Relation Age of Onset  "  • Sleep Apnea Neg Hx            Physical Exam:  Vitals/ General Appearance:   Weight/BMI: Body mass index is 31.45 kg/m².  Blood pressure 120/72, pulse (!) 46, resp. rate 16, height 1.753 m (5' 9\"), weight 96.6 kg (213 lb), SpO2 97 %.  Vitals:    09/07/18 0816   BP: 120/72   Pulse: (!) 46   Resp: 16   SpO2: 97%   Weight: 96.6 kg (213 lb)   Height: 1.753 m (5' 9\")       Pt. is alert and oriented to time, place and person. Cooperative and in no apparent distress.       1. Head: Atraumatic, normocephalic.   2. Ears: Normal tympanic membrane and no discharge  3. Nose: No inferior turbinate hypertophy   4. Throat: Oropharynx appears crowded in that the palate is overhanging (Malam Mary Kate scale 3. Tonsils are not enlarged, uvula is n large, pharynx not inflamed. Tongue is large. has intact dentition and oral hygiene is fair.  5. Neck: Supple. No thyromegaly  6. Chest: Trachea central  7. Lungs auscultation: B/L good air entry, vesicular breath sounds, no adventitious sounds  8. Heart auscultation: 1st and 2nd heart sounds normal, regular rhythm. No appreciable murmur.  9. . Extremities:  no pedal edema.   Peripheral pulses felt.  10. Skin: No rash  11. NEUROLOGICAL EXAMINATION: On neurological exam, the patient was alert and oriented x3. speech was clear and fluent without dysarthria.   INVESTIGATIONS:       ASSESSMENT AND PLAN     1. He  has symptoms of sleep disorder breathing. He  has excessive daytime sleepiness        The pathophysiology of MARIO ALBERTO and the increased risk of cardiovascular morbidity from untreated MARIO ALBERTO is discussed in detail with the patient.      We have discussed diagnostic options including in-laboratory, attended polysomnography and home sleep testing. We have also discussed treatment options including airway pressurization, reconstructive otolaryngologic surgery, dental appliances and weight management.       Subsequently,treatment options will be discussed based on the diagnostic study. Meanwhile, " He is urged to avoid supine sleep, weight gain and alcoholic beverages since all of these can worsen MARIO ALBERTO. He is cautioned against drowsy driving. If He feels sleepy while driving, He must pull over for a break/nap, rather than persist on the road, in the interest of He own safety and that of others on the road.    Plan  -  He  will be scheduled for an overnight PSG to assess sleep related  breathing disorder.    2. Restless Leg syndrome: he has hx of RLS and currently not on medication. It does interfere with sleep onset few times a week however denies issues with sleep maintanance. Denies hx of anemia. RLS could be primary or secondary in nature. RLS can be exacerbated in light of sleep related breathing disorder. Other common cause of RLS is low ferritin level, which were ordered today. We will reassess RLS symptoms once MARIO ALBERTO is treated and well controlled. Medication options were discussed however deferred on this visit.         3. Regarding treatment of other past medical problems and general health maintenance,  He is urged to follow up with PCP.    Patient's body mass index is 31.45 kg/m². Exercise and nutrition counseling were performed at this visit.              Electronically signed by Josh Chapman M.D.  on 09/07/18    No Recipients

## 2018-09-07 NOTE — TELEPHONE ENCOUNTER
"Called pt wanting to schedule him for his SS that Dr. Chapman order on his OV on 9/7/18. Per pt \" he does not want to do the SS.   "

## 2018-10-24 ENCOUNTER — TELEPHONE (OUTPATIENT)
Dept: MEDICAL GROUP | Facility: MEDICAL CENTER | Age: 23
End: 2018-10-24

## 2019-01-31 ENCOUNTER — HOSPITAL ENCOUNTER (EMERGENCY)
Facility: MEDICAL CENTER | Age: 24
End: 2019-01-31
Attending: EMERGENCY MEDICINE
Payer: COMMERCIAL

## 2019-01-31 VITALS
TEMPERATURE: 96.6 F | HEART RATE: 81 BPM | RESPIRATION RATE: 18 BRPM | DIASTOLIC BLOOD PRESSURE: 78 MMHG | OXYGEN SATURATION: 97 % | HEIGHT: 70 IN | SYSTOLIC BLOOD PRESSURE: 117 MMHG | BODY MASS INDEX: 30.61 KG/M2 | WEIGHT: 213.85 LBS

## 2019-01-31 DIAGNOSIS — S06.0X1A CONCUSSION WITH BRIEF LOSS OF CONSCIOUSNESS: ICD-10-CM

## 2019-01-31 PROCEDURE — 99283 EMERGENCY DEPT VISIT LOW MDM: CPT

## 2019-01-31 NOTE — ED PROVIDER NOTES
"ED Provider Note    CHIEF COMPLAINT  Chief Complaint   Patient presents with   • Medical Clearance     Pt is a , took a fall on saturday, +LOC, his employer is requiring him to be cleared by a doctor.  Pt was wearing a helmet/   • T-5000       HPI  Jaylen Mckeon is a 23 y.o. male who presents with head trauma.  Sustained an on-the-job injury on 1/26.  Skiing fell hit his head blacked out for 1 or 2 seconds.  Felt fine afterwards without any additional symptoms.  He is taken the last few days off work but needs a note to go back to work.  He denies having any ongoing headache,, blurred vision, weakness, numbness or neurologic symptoms.  No difficulty with concentrating or reading.    REVIEW OF SYSTEMS  As per [facility]    PAST MEDICAL HISTORY  History reviewed. No pertinent past medical history.    FAMILY HISTORY  Family History   Problem Relation Age of Onset   • Sleep Apnea Neg Hx        SOCIAL HISTORY  Social History   Substance Use Topics   • Smoking status: Never Smoker   • Smokeless tobacco: Never Used   • Alcohol use No       SURGICAL HISTORY  Past Surgical History:   Procedure Laterality Date   • ORCHIOPEXY ADULT Bilateral     Testicular torsions       CURRENT MEDICATIONS  Home Medications     Reviewed by Dg Grimes R.N. (Registered Nurse) on 01/31/19 at 0533  Med List Status: <None>   Medication Last Dose Status        Patient Eric Taking any Medications                       ALLERGIES  Allergies   Allergen Reactions   • Ativan      Other reaction(s): Other   • Augmentin Hives   • Lorazepam Unspecified     Convulsions   • Amoxicillin-Pot Clavulanate Hives and Rash     Moderate to severe, as an infant       PHYSICAL EXAM  VITAL SIGNS: /78   Pulse 81   Temp 35.9 °C (96.6 °F) (Temporal)   Resp 18   Ht 1.778 m (5' 10\")   Wt 97 kg (213 lb 13.5 oz)   SpO2 97%   BMI 30.68 kg/m²     Constitutional:  Well developed, Well nourished, No acute distress, Non-toxic appearance.   HENT: " Head without trauma  Eyes: PERRL, EOMI  Neck: Normal range of motion, No tenderness, Nexus negative  Cardiovascular: Normal heart rate, Normal rhythm  Thorax & Lungs: No respiratory distress  Extremities: Intact distal pulses, atraumatic, full range of motion  Neurologic: Alert, Normal motor function, Normal sensory function, No focal deficits noted, normal gait, negative Romberg      COURSE & MEDICAL DECISION MAKING  Patient presents the ER with history and physical consistent with concussion with brief loss of consciousness.  Discussed concussion.  It is stressed that he cannot return to any activities that result and development of headache or have any potential for repeat head injury until he is symptom free for at least 7 days.  At this point I will release him to go back to work as he says he can perform activities that do not have any chance of head injury.  He has not had any symptoms with reading or performing his regular activities of daily life.  He will be referred to [facility] occupational health.  Advised that he is checked if he has any symptoms moving forward.    FINAL IMPRESSION  1. Concussion with brief loss of consciousness      This dictation was created using voice recognition software. The accuracy of the dictation is limited to the abilities of the software. I expect there may be some errors of grammar and possibly content. The nursing notes were reviewed and certain aspects of this information were incorporated into this note.      Electronically signed by: Alber Vilchis, 1/31/2019 6:22 AM

## 2019-01-31 NOTE — ED NOTES
Discharging patient home. Verbalized understanding of discharge instructions, follow up appointments, and home care. All questions answered and all personal belongings with patient at time of discharge. Vital signs WNL. Patient given discharge information papers and discharge assessment completed.     Pt to lobby with steady gait.

## 2019-01-31 NOTE — ED TRIAGE NOTES
"Jaylen Mckeon  23 y.o. male  Chief Complaint   Patient presents with   • Medical Clearance     Pt is a , took a fall on saturday, +LOC, his employer is requiring him to be cleared by a doctor.  Pt was wearing a helmet/   • T-5000       Pt amb to triage with steady gait for above complaint.   Pt is alert and oriented, speaking in full sentences, follows commands and responds appropriately to questions. NAD. Resp are even and unlabored.  Pt placed in lobby. Pt educated on triage process. Pt encouraged to alert staff for any changes.  /82   Pulse 86   Temp 35.9 °C (96.6 °F) (Temporal)   Resp 16   Ht 1.778 m (5' 10\")   Wt 97 kg (213 lb 13.5 oz)   SpO2 98%   BMI 30.68 kg/m²     "

## 2019-02-25 ENCOUNTER — OFFICE VISIT (OUTPATIENT)
Dept: MEDICAL GROUP | Facility: MEDICAL CENTER | Age: 24
End: 2019-02-25
Payer: COMMERCIAL

## 2019-02-25 VITALS
HEART RATE: 87 BPM | WEIGHT: 199.8 LBS | BODY MASS INDEX: 30.28 KG/M2 | DIASTOLIC BLOOD PRESSURE: 64 MMHG | HEIGHT: 68 IN | SYSTOLIC BLOOD PRESSURE: 100 MMHG | TEMPERATURE: 98.4 F | OXYGEN SATURATION: 98 %

## 2019-02-25 DIAGNOSIS — Z23 NEEDS FLU SHOT: ICD-10-CM

## 2019-02-25 DIAGNOSIS — L60.0 INGROWN LEFT BIG TOENAIL: ICD-10-CM

## 2019-02-25 DIAGNOSIS — L60.0 INGROWN RIGHT BIG TOENAIL: ICD-10-CM

## 2019-02-25 PROCEDURE — 99214 OFFICE O/P EST MOD 30 MIN: CPT | Performed by: FAMILY MEDICINE

## 2019-02-25 ASSESSMENT — PATIENT HEALTH QUESTIONNAIRE - PHQ9: CLINICAL INTERPRETATION OF PHQ2 SCORE: 0

## 2019-02-25 NOTE — ASSESSMENT & PLAN NOTE
This is a new problem for my medical evaluation, uncontrolled, patient states that he has had ingrown toenails of the lateral margins of bilateral great toes over the last few weeks.  Patient noticed this roughly 2 weeks ago, therefore has been addressing this with local wound care.  He at first noticed that it was occurring with his right foot, and address that prior to discovering that his left foot was also involved.  Therefore, his right foot has been healing better.    No purulent drainage at present time, although there is some mild bleeding from the left great toe lateral margin.  Additionally, patient denies any permanent paresthesias or numbness that is extending from his great toes proximally.    Patient states that this injury first occurred when he was skiing, and jumped off a 10 foot height.  He did notice an impact in bilateral toes, right worse than left.  Patient works as a , and therefore is wearing snow boots with athletic socks on a daily basis.  Patient is not noticing any great amount of bloody drainage from either foot at present time.  We did review toenail care as well as local foot care, and patient verbalized understanding, and patient and I do not believe that podiatry referral is needed at present time.

## 2019-02-25 NOTE — PATIENT INSTRUCTIONS
Ingrown Toenail  An ingrown toenail occurs when the corner or sides of your toenail grow into the surrounding skin. The big toe is most commonly affected, but it can happen to any of your toes. If your ingrown toenail is not treated, you will be at risk for infection.  What are the causes?  This condition may be caused by:  · Wearing shoes that are too small or tight.  · Injury or trauma, such as stubbing your toe or having your toe stepped on.  · Improper cutting or care of your toenails.  · Being born with (congenital) nail or foot abnormalities, such as having a nail that is too big for your toe.  What increases the risk?  Risk factors for an ingrown toenail include:  · Age. Your nails tend to thicken as you get older, so ingrown nails are more common in older people.  · Diabetes.  · Cutting your toenails incorrectly.  · Blood circulation problems.  What are the signs or symptoms?  Symptoms may include:  · Pain, soreness, or tenderness.  · Redness.  · Swelling.  · Hardening of the skin surrounding the toe.  Your ingrown toenail may be infected if there is fluid, pus, or drainage.  How is this diagnosed?  An ingrown toenail may be diagnosed by medical history and physical exam. If your toenail is infected, your health care provider may test a sample of the drainage.  How is this treated?  Treatment depends on the severity of your ingrown toenail. Some ingrown toenails may be treated at home. More severe or infected ingrown toenails may require surgery to remove all or part of the nail. Infected ingrown toenails may also be treated with antibiotic medicines.  Follow these instructions at home:  · If you were prescribed an antibiotic medicine, finish all of it even if you start to feel better.  · Soak your foot in warm soapy water for 20 minutes, 3 times per day or as directed by your health care provider.  · Carefully lift the edge of the nail away from the sore skin by wedging a small piece of cotton under the  corner of the nail. This may help with the pain. Be careful not to cause more injury to the area.  · Wear shoes that fit well. If your ingrown toenail is causing you pain, try wearing sandals, if possible.  · Trim your toenails regularly and carefully. Do not cut them in a curved shape. Cut your toenails straight across. This prevents injury to the skin at the corners of the toenail.  · Keep your feet clean and dry.  · If you are having trouble walking and are given crutches by your health care provider, use them as directed.  · Do not pick at your toenail or try to remove it yourself.  · Take medicines only as directed by your health care provider.  · Keep all follow-up visits as directed by your health care provider. This is important.  Contact a health care provider if:  · Your symptoms do not improve with treatment.  Get help right away if:  · You have red streaks that start at your foot and go up your leg.  · You have a fever.  · You have increased redness, swelling, or pain.  · You have fluid, blood, or pus coming from your toenail.  This information is not intended to replace advice given to you by your health care provider. Make sure you discuss any questions you have with your health care provider.  Document Released: 12/15/2001 Document Revised: 05/19/2017 Document Reviewed: 11/11/2015  [employer] Interactive Patient Education © 2017 [employer]

## 2019-02-25 NOTE — PROGRESS NOTES
Subjective:   Chief Complaint/History of Present Illness:  Jaylen Mckeon is a 23 y.o. male established patient who presents today to discuss medical problems as listed below    Diagnoses of Ingrown left big toenail, Ingrown right big toenail, and Needs flu shot were pertinent to this visit.    Ingrown left big toenail  This is a new problem for my medical evaluation, uncontrolled, patient states that he has had ingrown toenails of the lateral margins of bilateral great toes over the last few weeks.  Patient noticed this roughly 2 weeks ago, therefore has been addressing this with local wound care.  He at first noticed that it was occurring with his right foot, and address that prior to discovering that his left foot was also involved.  Therefore, his right foot has been healing better.    No purulent drainage at present time, although there is some mild bleeding from the left great toe lateral margin.  Additionally, patient denies any permanent paresthesias or numbness that is extending from his great toes proximally.    Patient states that this injury first occurred when he was skiing, and jumped off a 10 foot height.  He did notice an impact in bilateral toes, right worse than left.  Patient works as a , and therefore is wearing snow boots with athletic socks on a daily basis.  Patient is not noticing any great amount of bloody drainage from either foot at present time.  We did review toenail care as well as local foot care, and patient verbalized understanding, and patient and I do not believe that podiatry referral is needed at present time.    Ingrown right big toenail  New problem for medical evaluation, uncontrolled, please see notes from same date of service 2/25/2019 regarding ingrown left big toenail.      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 22 g 0     No current facility-administered medications for this visit.         Social History:     Social History   Substance Use Topics   • Smoking status: Never Smoker   • Smokeless tobacco: Never Used   • Alcohol use No       ROS:     - NOTE: All other systems reviewed and are negative, except as in HPI.     Objective:   Physical Exam:    Vitals: Blood pressure 100/64, pulse 87, temperature 36.9 °C (98.4 °F), height 1.727 m (5' 7.99\"), weight 90.6 kg (199 lb 12.8 oz), SpO2 98 %.   BMI: Body mass index is 30.39 kg/m².   General/Constitutional: Vitals as above, Well nourished, well developed male in no acute distress   Head/Eyes: Head is grossly normal & atraumatic, bilateral conjunctivae clear and not injected, bilateral EOMI, bilateral PERRL   ENT: Bilateral external ears grossly normal in appearance, Hearing grossly intact, External nares normal in appearance and without discharge/bleeding   Respiratory: No respiratory distress, bilateral lungs are clear to ausculation in all lung fields (anterior/lateral/posterior), no wheezing/rhonchi/rales   Cardiovascular: distal pulses are intact and equal bilaterally (dorsalis pedis, posterior tibial), no bilateral lower extremity edema   MSK: Gait grossly normal & not antalgic   Integumentary: Patient with some mild eschars/dried blood of the lateral margin of the left great toe with minimal to mild peripheral erythema but no tenderness to palpation of the soft tissues adjacent to the " lateral margin of the left great toenail, right great toenail and right great toe without any obvious signs of injury and with good healthy appearing skin, no blood, no purulent drainage, otherwise no apparent rashes   Psych: Judgment grossly appropriate, no apparent depression/anxiety    Health Maintenance:     - Not reviewed at present time    Imaging/Labs:     - 08/03/18 -- low vitamin D, otherwise labs are normal    Assessment and Plan:   1. Ingrown left big toenail  2. Ingrown right big toenail  New problem, uncontrolled, patient instructed on local wound care and toenail care.  Patient made aware that he can use topical antibiotics whether OTC, or as below.  Additionally, patient made aware that he can have referral to podiatry.  Patient verbalized understanding of the above.   - mupirocin (BACTROBAN) 2 % Ointment; Apply 1 Application to affected area(s) every day.  Dispense: 22 g; Refill: 0    3. Needs flu shot   - Influenza Vaccine Quad Injection >3Y (PF)        RTC: As needed for worsening of ingrown toenails.    PLEASE NOTE: This dictation was created using voice recognition software. I have made every reasonable attempt to correct obvious errors, but I expect that there are errors of grammar and possibly content that I did not discover before finalizing the note.

## 2019-02-25 NOTE — ASSESSMENT & PLAN NOTE
New problem for medical evaluation, uncontrolled, please see notes from same date of service 2/25/2019 regarding ingrown left big toenail.

## 2019-12-18 ENCOUNTER — OFFICE VISIT (OUTPATIENT)
Dept: MEDICAL GROUP | Facility: LAB | Age: 24
End: 2019-12-18
Payer: COMMERCIAL

## 2019-12-18 VITALS
RESPIRATION RATE: 19 BRPM | SYSTOLIC BLOOD PRESSURE: 114 MMHG | WEIGHT: 195 LBS | TEMPERATURE: 99.4 F | BODY MASS INDEX: 29.66 KG/M2 | HEART RATE: 82 BPM | DIASTOLIC BLOOD PRESSURE: 72 MMHG | OXYGEN SATURATION: 99 %

## 2019-12-18 DIAGNOSIS — G47.00 INSOMNIA, UNSPECIFIED TYPE: ICD-10-CM

## 2019-12-18 DIAGNOSIS — F41.1 GENERALIZED ANXIETY DISORDER: ICD-10-CM

## 2019-12-18 PROBLEM — E66.811 OBESITY (BMI 30.0-34.9): Status: RESOLVED | Noted: 2018-07-30 | Resolved: 2019-12-18

## 2019-12-18 PROBLEM — G25.3 MYOCLONIC DISORDER: Status: ACTIVE | Noted: 2019-12-18

## 2019-12-18 PROBLEM — E66.9 OBESITY (BMI 30.0-34.9): Status: RESOLVED | Noted: 2018-07-30 | Resolved: 2019-12-18

## 2019-12-18 PROBLEM — L60.0 INGROWN LEFT BIG TOENAIL: Status: RESOLVED | Noted: 2019-02-25 | Resolved: 2019-12-18

## 2019-12-18 PROBLEM — F43.10 POSTTRAUMATIC STRESS DISORDER: Status: ACTIVE | Noted: 2019-12-18

## 2019-12-18 PROBLEM — R53.83 OTHER FATIGUE: Status: RESOLVED | Noted: 2018-07-30 | Resolved: 2019-12-18

## 2019-12-18 PROBLEM — L60.0 INGROWN RIGHT BIG TOENAIL: Status: RESOLVED | Noted: 2019-02-25 | Resolved: 2019-12-18

## 2019-12-18 PROCEDURE — 99213 OFFICE O/P EST LOW 20 MIN: CPT | Performed by: FAMILY MEDICINE

## 2019-12-18 RX ORDER — BACLOFEN 10 MG/1
TABLET ORAL
COMMUNITY
Start: 2016-03-16 | End: 2021-06-08

## 2019-12-18 RX ORDER — BUPROPION HYDROCHLORIDE 150 MG/1
TABLET, EXTENDED RELEASE ORAL
COMMUNITY
Start: 2009-08-26 | End: 2019-12-18

## 2019-12-18 RX ORDER — BUSPIRONE HYDROCHLORIDE 15 MG/1
TABLET ORAL
COMMUNITY
Start: 2009-08-26 | End: 2019-12-18

## 2019-12-18 ASSESSMENT — ENCOUNTER SYMPTOMS
NERVOUS/ANXIOUS: 1
FOCAL WEAKNESS: 0
PALPITATIONS: 0
WHEEZING: 0
MYALGIAS: 0
SORE THROAT: 0
DIARRHEA: 0
DIZZINESS: 0
VOMITING: 0
COUGH: 0
DEPRESSION: 0
CONSTIPATION: 0
HEADACHES: 0
SHORTNESS OF BREATH: 0
FEVER: 0
BLURRED VISION: 0
NAUSEA: 0
CHILLS: 0
ABDOMINAL PAIN: 0

## 2019-12-18 NOTE — PROGRESS NOTES
Jaylen Mckeon is a 24 y.o. male here to establish care and discuss chronic conditions.    HPI:      Generalized anxiety  Had myoclonic attacks with this in past. No episodes for 4 years.  Reports this initially presented with severe muscle spasms that ended up in ER.  He did see neurology with thorough work-up, symptoms were due to anxiety.  He reports anxiety is still there, but is much improved from past and is currently not concerned about it.    Fatigue  Had planned to do sleep study but reports this has improved with taking better care of himself.    Current medicines (including changes today)  Current Outpatient Medications   Medication Sig Dispense Refill   • baclofen (LIORESAL) 10 MG Tab baclofen 10 mg tablet   1 tablet as needed for convulsions, repeat if does not subside       No current facility-administered medications for this visit.      He  has no past medical history on file.  He  has a past surgical history that includes orchiopexy adult (Bilateral).  Social History     Tobacco Use   • Smoking status: Never Smoker   • Smokeless tobacco: Never Used   Substance Use Topics   • Alcohol use: No   • Drug use: No     Types: Marijuana     Comment: for aniexty  and sleep      Social History     Patient does not qualify to have social determinant information on file (likely too young).   Social History Narrative   • Not on file     Family History   Problem Relation Age of Onset   • Sleep Apnea Neg Hx      Family Status   Relation Name Status   • Neg Hx  (Not Specified)       ROS  Review of Systems   Constitutional: Negative for chills and fever.   HENT: Negative for congestion and sore throat.    Eyes: Negative for blurred vision.   Respiratory: Negative for cough, shortness of breath and wheezing.    Cardiovascular: Negative for chest pain and palpitations.   Gastrointestinal: Negative for abdominal pain, constipation, diarrhea, nausea and vomiting.   Genitourinary: Negative for dysuria and urgency.    Musculoskeletal: Negative for joint pain and myalgias.   Skin: Negative for rash.   Neurological: Negative for dizziness, focal weakness and headaches.   Psychiatric/Behavioral: Negative for depression. The patient is nervous/anxious.    All other systems reviewed and are negative.        Objective:     Physical Exam:  /72 (BP Location: Left arm, Patient Position: Sitting, BP Cuff Size: Adult)   Pulse 82   Temp 37.4 °C (99.4 °F) (Temporal)   Resp 19   Wt 88.5 kg (195 lb)   SpO2 99%  Body mass index is 29.66 kg/m².  Constitutional: Alert. Well appearing. No distress.  Skin: Warm, dry, good turgor, no visible rashes.  Eye: Equal, round and reactive to light, conjunctiva clear, lids normal.  ENMT: Moist mucous membranes. Normal dentition. Oropharynx clear.  Neck: Trachea midline, no masses, no thyromegaly. No cervical or supraclavicular lymphadenopathy.  Respiratory: Normal effort. Lungs are clear to auscultation bilaterally.  Cardiovascular: Regular rate and rhythm. Normal S1/S2. No murmurs, rubs or gallops.   Abdomen: Soft, non-tender, non-distended. No masses, no hepatosplenomegaly.  Neuro: Moves all four extremities. No facial droop.  Psych: Answers questions appropriately. Normal affect and mood.    Assessment and Plan:     1. Generalized anxiety disorder  Chronic issue.  Previously had severe attacks with muscle spasm.  He said not occurred for over 4 years.  Ports anxiety is stable and is not concerned.  Does have baclofen to take at these attacks recur.    2. Insomnia, unspecified type  Chronic issue, improved.  Plan to do sleep study but he reports this is improved with lifestyle changes.  He is currently not concerned about it.  Could consider sleep study in future if this worsens.    Follow up: Return in about 1 year (around 12/18/2020) for annual visit.         PLEASE NOTE: This note was created using voice recognition software.

## 2020-03-10 ENCOUNTER — OFFICE VISIT (OUTPATIENT)
Dept: MEDICAL GROUP | Facility: LAB | Age: 25
End: 2020-03-10
Payer: COMMERCIAL

## 2020-03-10 VITALS
SYSTOLIC BLOOD PRESSURE: 104 MMHG | TEMPERATURE: 98.3 F | HEIGHT: 68 IN | HEART RATE: 58 BPM | WEIGHT: 189 LBS | BODY MASS INDEX: 28.64 KG/M2 | DIASTOLIC BLOOD PRESSURE: 76 MMHG | RESPIRATION RATE: 12 BRPM | OXYGEN SATURATION: 99 %

## 2020-03-10 DIAGNOSIS — F43.10 POSTTRAUMATIC STRESS DISORDER: ICD-10-CM

## 2020-03-10 DIAGNOSIS — M79.601 RIGHT ARM PAIN: ICD-10-CM

## 2020-03-10 PROCEDURE — 99213 OFFICE O/P EST LOW 20 MIN: CPT | Performed by: FAMILY MEDICINE

## 2020-03-10 ASSESSMENT — PATIENT HEALTH QUESTIONNAIRE - PHQ9
CLINICAL INTERPRETATION OF PHQ2 SCORE: 3
SUM OF ALL RESPONSES TO PHQ QUESTIONS 1-9: 9
5. POOR APPETITE OR OVEREATING: 0 - NOT AT ALL

## 2020-03-10 NOTE — PROGRESS NOTES
"Subjective:     CC: Right arm pain    HPI:   Jaylen presents today with right arm pain.    Right arm pain  Pain just above right elbow on triceps.. Started 1 week ago, feels like \"tearing\" with movement. 8-9/10.  Worse with moving right or right elbow. Achy at rest. No known injury. No swelling, no bruise. Does feel like arm is weaker. No numbness/tingling.    He does have some chronic neck issues with history of disc herniation which flared a few weeks ago but have since improved.    Mood  History of PTSD.  Positive PHQ 2 screening.  He does report thoughts of self-harm on PHQ 9.  Further discussion reveals these have been present for years and he has no current concerns.  No no concern to act on these thoughts and no plan.  Reports he is feels safe does not want to pursue any treatment for this.    Health Maintenance: Completed    Medications, past medical history, allergies, and social history have been reviewed and updated.    ROS: See HPI    Objective:       Exam:  /76 (BP Location: Right arm, Patient Position: Sitting, BP Cuff Size: Adult)   Pulse (!) 58   Temp 36.8 °C (98.3 °F)   Resp 12   Ht 1.727 m (5' 8\")   Wt 85.7 kg (189 lb)   SpO2 99%   BMI 28.74 kg/m²  Body mass index is 28.74 kg/m².    Constitutional: Alert. Well appearing. No distress.  Skin: Warm, dry, good turgor, no visible rashes.  MSK: No deformity, edema or erythema to right upper arm.  No tenderness to area of pain.  5 out of 5 strength to flexion and extension at bilateral elbows.  Normal range of motion of bilateral elbows and shoulders.  Normal strength to  bilateral wrist extension and  strength.  Sensation is grossly intact to light touch to both arms.  Neck: Normal range of motion.  No C-spine tenderness.  Positive Spurling test on left.  Psych: Answers questions appropriately. Normal affect and mood.    Assessment & Plan:     24 y.o. male with the following -     1. Right arm pain  One week of pain worse with activity to " distal right triceps.  No known injury.  Worse with movement at right shoulder right elbow.  He does have history of cervical disc herniation and this did flare recently as well but has since improved.  Exam with intact strength and no tenderness.  He does have a positive Spurling test. Triceps strain versus secondary to cervical disc herniation.  Continue with conservative management, discussed avoiding exacerbating activities and anti-inflammatories as needed.  If this is not improving would consider referral for physical therapy.    2. Posttraumatic stress disorder  Chronic issue.  Positive screening on PHQ 2 with reported thoughts of self-harm on PHQ 9.  He does not have any concerns about this today and does not have any plan to act on these thoughts.  States these are chronic and unchanged.  Reports no current concerns for mood and does not want to pursue further treatment.    Please note that this note was created using voice recognition software.

## 2020-09-22 ENCOUNTER — APPOINTMENT (OUTPATIENT)
Dept: MEDICAL GROUP | Facility: LAB | Age: 25
End: 2020-09-22
Payer: COMMERCIAL

## 2020-09-23 ENCOUNTER — OFFICE VISIT (OUTPATIENT)
Dept: MEDICAL GROUP | Facility: LAB | Age: 25
End: 2020-09-23
Payer: COMMERCIAL

## 2020-09-23 ENCOUNTER — HOSPITAL ENCOUNTER (OUTPATIENT)
Dept: RADIOLOGY | Facility: MEDICAL CENTER | Age: 25
End: 2020-09-23
Attending: FAMILY MEDICINE
Payer: COMMERCIAL

## 2020-09-23 VITALS
BODY MASS INDEX: 28.34 KG/M2 | HEART RATE: 60 BPM | DIASTOLIC BLOOD PRESSURE: 78 MMHG | HEIGHT: 68 IN | TEMPERATURE: 98.4 F | RESPIRATION RATE: 12 BRPM | SYSTOLIC BLOOD PRESSURE: 108 MMHG | OXYGEN SATURATION: 99 % | WEIGHT: 187 LBS

## 2020-09-23 DIAGNOSIS — M54.6 ACUTE MIDLINE THORACIC BACK PAIN: ICD-10-CM

## 2020-09-23 DIAGNOSIS — R20.0 HAND NUMBNESS: ICD-10-CM

## 2020-09-23 PROCEDURE — 72040 X-RAY EXAM NECK SPINE 2-3 VW: CPT

## 2020-09-23 PROCEDURE — 72100 X-RAY EXAM L-S SPINE 2/3 VWS: CPT

## 2020-09-23 PROCEDURE — 99213 OFFICE O/P EST LOW 20 MIN: CPT | Performed by: FAMILY MEDICINE

## 2020-09-23 PROCEDURE — 72072 X-RAY EXAM THORAC SPINE 3VWS: CPT

## 2020-09-23 NOTE — PROGRESS NOTES
"Subjective:     CC: Back pain, hand numbness    HPI:   Jaylen presents today to discuss:    Back pain  Weight machine fell on him and he fell onto back 1.5 months ago. Pain never completely resolved from lower back.  Now also with shooting pain in middle spine that began suddenly after bending over today. Midline thoracic pain that radiates laterally to both sides.    No bowel/bladder incontinence, no numbness/weakness in legs. No fever/chills.    Left hand numbness  Intermittent numbness to left hand.  He does note that it starts in the first 3 fingers and seems to spread to the whole hand.  No obvious aggravating activities, shaking and does not help.  No weakness    Health Maintenance: Completed    Medications, past medical history, allergies, and social history have been reviewed and updated.    ROS:  See HPI    Objective:       Exam:  /78 (BP Location: Right arm, Patient Position: Sitting, BP Cuff Size: Adult)   Pulse 60   Temp 36.9 °C (98.4 °F)   Resp 12   Ht 1.727 m (5' 8\")   Wt 84.8 kg (187 lb)   SpO2 99%   BMI 28.43 kg/m²  Body mass index is 28.43 kg/m².    Constitutional: Alert. Well appearing. No distress.  Skin: Warm, dry, good turgor, no visible rashes.  Eye: Equal, round and reactive to light, conjunctiva clear, lids normal.  ENMT: Moist mucous membranes. Normal dentition.  Respiratory: Normal effort.   Spine: No significant spinal curvature on forward bend.  There is midline tenderness over the thoracic spine and thoracic spine pain with axial loading.  Negative Spurling sign.  Hand: Negative Phalen's and Tinel signs to left hand.  No thenar wasting.  Normal strength with okay sign and thumb adduction.   Neuro: 5/5 strength to all 4 extremities.  Sensation is grossly intact light touch for all 4 extremities.  Psych: Answers questions appropriately. Normal affect and mood.      Assessment & Plan:     24 y.o. male with the following -     1. Acute midline thoracic back pain  New issue.  " Continued lumbar and thoracic back pain after weight machine fell on him 1.5 months ago.  He does have midline tenderness over the thoracic spine.  X-rays as below.  If these are negative discussed continued conservative management and follow-up if not improving.  - DX-THORACIC SPINE-2 VIEWS; Future  - DX-CERVICAL SPINE-2 OR 3 VIEWS; Future  - DX-LUMBAR SPINE-2 OR 3 VIEWS; Future    2. Hand numbness  Reports intermittent numbness to left hand that seem to start in the first 3 fingers.  Exam is unremarkable.  Possible carpal tunnel.  Discussed nighttime splinting and follow-up if not improving.      Please note that this note was created using voice recognition software.

## 2020-10-23 ENCOUNTER — NON-PROVIDER VISIT (OUTPATIENT)
Dept: MEDICAL GROUP | Facility: LAB | Age: 25
End: 2020-10-23
Payer: COMMERCIAL

## 2020-10-23 DIAGNOSIS — Z23 NEED FOR VACCINATION: ICD-10-CM

## 2020-10-23 PROCEDURE — 90471 IMMUNIZATION ADMIN: CPT | Performed by: FAMILY MEDICINE

## 2020-10-23 PROCEDURE — 90686 IIV4 VACC NO PRSV 0.5 ML IM: CPT | Performed by: FAMILY MEDICINE

## 2020-10-23 NOTE — PROGRESS NOTES
"Jaylen Mckeon is a 24 y.o. male here for a non-provider visit for:   FLU    Reason for immunization: Annual Flu Vaccine  Immunization records indicate need for vaccine: Yes, confirmed with Epic  Minimum interval has been met for this vaccine: Yes  ABN completed: Not Indicated    Order and dose verified by: AB   VIS Dated  8/15/19 was given to patient: Yes  All IAC Questionnaire questions were answered \"No.\"    Patient tolerated injection and no adverse effects were observed or reported: Yes    Pt scheduled for next dose in series: Not Indicated  "

## 2020-12-01 NOTE — TELEPHONE ENCOUNTER
Pt did not come back for his PPD read on time. Pt will have to do a repeat PPD placement.    Other/Gestational Diabetes

## 2021-03-24 ENCOUNTER — TELEMEDICINE (OUTPATIENT)
Dept: MEDICAL GROUP | Facility: LAB | Age: 26
End: 2021-03-24
Payer: COMMERCIAL

## 2021-03-24 VITALS — HEIGHT: 68 IN | BODY MASS INDEX: 27.28 KG/M2 | WEIGHT: 180 LBS | TEMPERATURE: 97.5 F | HEART RATE: 60 BPM

## 2021-03-24 DIAGNOSIS — M21.611 BUNION OF RIGHT FOOT: ICD-10-CM

## 2021-03-24 DIAGNOSIS — F33.41 RECURRENT MAJOR DEPRESSIVE DISORDER, IN PARTIAL REMISSION (HCC): ICD-10-CM

## 2021-03-24 PROCEDURE — 99213 OFFICE O/P EST LOW 20 MIN: CPT | Mod: 95,CR | Performed by: FAMILY MEDICINE

## 2021-03-24 ASSESSMENT — PATIENT HEALTH QUESTIONNAIRE - PHQ9: CLINICAL INTERPRETATION OF PHQ2 SCORE: 0

## 2021-03-24 NOTE — PROGRESS NOTES
Virtual Visit: Established Patient   This visit was conducted via Zoom using secure and encrypted videoconferencing technology. The patient was in a private location in the state of [place].    The patient's identity was confirmed and verbal consent was obtained for this virtual visit.    Subjective:   CC:   Chief Complaint   Patient presents with   • Referral Needed     podiarty, bunions       Jaylen Mckeon is a 25 y.o. male presenting for evaluation and management of:    Right foot bandar  Was working as  but had to leave due to pain with bunion on right foot. At one point he was unable to ski due to this.  He would like referral to a specialist to discuss possible treatment or surgery.    Depression  Had severe episode a few months ago and ended up missing work.  Has since improved.  No thoughts of self-harm.  He is following regularly with a therapist.  He is asking if there are medications on an as-needed basis that help with depression.    ROS See HPI    Allergies   Allergen Reactions   • Ativan Anaphylaxis     Other reaction(s): OTHER   • Augmentin Hives   • Lorazepam Unspecified     Convulsions   • Amoxicillin-Pot Clavulanate Hives and Rash     Moderate to severe, as an infant       Current medicines (including changes today)  Current Outpatient Medications   Medication Sig Dispense Refill   • baclofen (LIORESAL) 10 MG Tab baclofen 10 mg tablet   1 tablet as needed for convulsions, repeat if does not subside       No current facility-administered medications for this visit.       Patient Active Problem List    Diagnosis Date Noted   • Myoclonic disorder 12/18/2019   • Posttraumatic stress disorder 12/18/2019   • Vitamin D deficiency 08/08/2018   • Insomnia 07/30/2018   • Cervical disc herniation 07/30/2018   • Generalized anxiety disorder 02/13/2018       Family History   Problem Relation Age of Onset   • Sleep Apnea Neg Hx        He  has a past medical history of Ingrown left big toenail  "(2/25/2019) and Ingrown right big toenail (2/25/2019).  He  has a past surgical history that includes orchiopexy adult (Bilateral).       Objective:   Pulse 60 Comment: Verbal  Temp 36.4 °C (97.5 °F) Comment: Verbal  Ht 1.727 m (5' 8\") Comment: Verbal  Wt 81.6 kg (180 lb) Comment: Verbal  BMI 27.37 kg/m²     Physical Exam:  Constitutional: Alert, no distress, well-groomed.  Skin: No rashes in visible areas.  Eye: Round. Conjunctiva clear, lids normal. No icterus.   ENMT: Lips pink without lesions, good dentition, moist mucous membranes. Phonation normal.  Neck: No masses, no thyromegaly. Moves freely without pain.  Respiratory: Unlabored respiratory effort, no cough or audible wheeze  Psych: Alert and oriented x3, normal affect and mood.       Assessment and Plan:   The following treatment plan was discussed:     1. Bunion of right foot  Reports severe bunions to right foot, no exam today is virtual visit.  Does report these have been significant enough to where he is not able to fit into ski boots.  Referral to podiatry placed.  - REFERRAL TO PODIATRY    2. Recurrent major depressive disorder, in partial remission (HCC)  Episode a few months ago that has since improved.  No thoughts of self-harm.  Following with therapist.  Discussed medications today but at this point defers, he will follow-up if you like to consider medication in the future.    Follow-up: Return if symptoms worsen or fail to improve.         "

## 2021-05-28 ENCOUNTER — TELEMEDICINE (OUTPATIENT)
Dept: ADMISSIONS | Facility: MEDICAL CENTER | Age: 26
End: 2021-05-28
Attending: PODIATRIST
Payer: COMMERCIAL

## 2021-05-28 DIAGNOSIS — Z01.812 PRE-OPERATIVE LABORATORY EXAMINATION: ICD-10-CM

## 2021-05-28 LAB — COVID ORDER STATUS COVID19: NORMAL

## 2021-05-28 PROCEDURE — C9803 HOPD COVID-19 SPEC COLLECT: HCPCS

## 2021-05-28 PROCEDURE — U0003 INFECTIOUS AGENT DETECTION BY NUCLEIC ACID (DNA OR RNA); SEVERE ACUTE RESPIRATORY SYNDROME CORONAVIRUS 2 (SARS-COV-2) (CORONAVIRUS DISEASE [COVID-19]), AMPLIFIED PROBE TECHNIQUE, MAKING USE OF HIGH THROUGHPUT TECHNOLOGIES AS DESCRIBED BY CMS-2020-01-R: HCPCS

## 2021-05-28 PROCEDURE — U0005 INFEC AGEN DETEC AMPLI PROBE: HCPCS

## 2021-05-28 NOTE — PREPROCEDURE INSTRUCTIONS
"Pre-admit appointment completed. \"Preparing for your Procedure\" sheet given to Pt via phone with verbal and emailed written instructions. Pt states all instructions given are understood and to call pre-admit or Dr's office for additional questions or any symptoms of illness/covid develop prior to DOS. Covid test given to patient on 5/28/21. Medications the patient will take the morning of surgery per anesthesia protocol: None    Denies anesthesia complications  "

## 2021-05-29 LAB
SARS-COV-2 RNA RESP QL NAA+PROBE: NOTDETECTED
SPECIMEN SOURCE: NORMAL

## 2021-06-03 ENCOUNTER — APPOINTMENT (OUTPATIENT)
Dept: RADIOLOGY | Facility: MEDICAL CENTER | Age: 26
End: 2021-06-03
Attending: PODIATRIST
Payer: COMMERCIAL

## 2021-06-03 ENCOUNTER — HOSPITAL ENCOUNTER (OUTPATIENT)
Facility: MEDICAL CENTER | Age: 26
End: 2021-06-03
Attending: PODIATRIST | Admitting: PODIATRIST
Payer: COMMERCIAL

## 2021-06-03 ENCOUNTER — ANESTHESIA EVENT (OUTPATIENT)
Dept: SURGERY | Facility: MEDICAL CENTER | Age: 26
End: 2021-06-03
Payer: COMMERCIAL

## 2021-06-03 ENCOUNTER — ANESTHESIA (OUTPATIENT)
Dept: SURGERY | Facility: MEDICAL CENTER | Age: 26
End: 2021-06-03
Payer: COMMERCIAL

## 2021-06-03 VITALS
SYSTOLIC BLOOD PRESSURE: 127 MMHG | HEART RATE: 78 BPM | HEIGHT: 69 IN | BODY MASS INDEX: 27.66 KG/M2 | TEMPERATURE: 97 F | OXYGEN SATURATION: 97 % | DIASTOLIC BLOOD PRESSURE: 73 MMHG | RESPIRATION RATE: 18 BRPM | WEIGHT: 186.73 LBS

## 2021-06-03 PROCEDURE — 160009 HCHG ANES TIME/MIN: Performed by: PODIATRIST

## 2021-06-03 PROCEDURE — A6454 SELF-ADHER BAND W>=3" <5"/YD: HCPCS | Performed by: PODIATRIST

## 2021-06-03 PROCEDURE — 160035 HCHG PACU - 1ST 60 MINS PHASE I: Performed by: PODIATRIST

## 2021-06-03 PROCEDURE — 160048 HCHG OR STATISTICAL LEVEL 1-5: Performed by: PODIATRIST

## 2021-06-03 PROCEDURE — A6223 GAUZE >16<=48 NO W/SAL W/O B: HCPCS | Performed by: PODIATRIST

## 2021-06-03 PROCEDURE — 700102 HCHG RX REV CODE 250 W/ 637 OVERRIDE(OP): Performed by: ANESTHESIOLOGY

## 2021-06-03 PROCEDURE — 700101 HCHG RX REV CODE 250: Performed by: ANESTHESIOLOGY

## 2021-06-03 PROCEDURE — C1769 GUIDE WIRE: HCPCS | Performed by: PODIATRIST

## 2021-06-03 PROCEDURE — C1713 ANCHOR/SCREW BN/BN,TIS/BN: HCPCS | Performed by: PODIATRIST

## 2021-06-03 PROCEDURE — 502000 HCHG MISC OR IMPLANTS RC 0278: Performed by: PODIATRIST

## 2021-06-03 PROCEDURE — 700111 HCHG RX REV CODE 636 W/ 250 OVERRIDE (IP): Performed by: ANESTHESIOLOGY

## 2021-06-03 PROCEDURE — A9270 NON-COVERED ITEM OR SERVICE: HCPCS | Performed by: ANESTHESIOLOGY

## 2021-06-03 PROCEDURE — 160002 HCHG RECOVERY MINUTES (STAT): Performed by: PODIATRIST

## 2021-06-03 PROCEDURE — 73630 X-RAY EXAM OF FOOT: CPT | Mod: RT

## 2021-06-03 PROCEDURE — 502240 HCHG MISC OR SUPPLY RC 0272: Performed by: PODIATRIST

## 2021-06-03 PROCEDURE — 500881 HCHG PACK, EXTREMITY: Performed by: PODIATRIST

## 2021-06-03 PROCEDURE — 160028 HCHG SURGERY MINUTES - 1ST 30 MINS LEVEL 3: Performed by: PODIATRIST

## 2021-06-03 PROCEDURE — 160025 RECOVERY II MINUTES (STATS): Performed by: PODIATRIST

## 2021-06-03 PROCEDURE — 700101 HCHG RX REV CODE 250: Performed by: PODIATRIST

## 2021-06-03 PROCEDURE — 160036 HCHG PACU - EA ADDL 30 MINS PHASE I: Performed by: PODIATRIST

## 2021-06-03 PROCEDURE — 160046 HCHG PACU - 1ST 60 MINS PHASE II: Performed by: PODIATRIST

## 2021-06-03 PROCEDURE — 501838 HCHG SUTURE GENERAL: Performed by: PODIATRIST

## 2021-06-03 PROCEDURE — 160039 HCHG SURGERY MINUTES - EA ADDL 1 MIN LEVEL 3: Performed by: PODIATRIST

## 2021-06-03 DEVICE — IMPLANTABLE DEVICE: Type: IMPLANTABLE DEVICE | Site: FOOT | Status: FUNCTIONAL

## 2021-06-03 RX ORDER — SODIUM CHLORIDE, SODIUM LACTATE, POTASSIUM CHLORIDE, CALCIUM CHLORIDE 600; 310; 30; 20 MG/100ML; MG/100ML; MG/100ML; MG/100ML
INJECTION, SOLUTION INTRAVENOUS CONTINUOUS
Status: ACTIVE | OUTPATIENT
Start: 2021-06-03 | End: 2021-06-03

## 2021-06-03 RX ORDER — LIDOCAINE HYDROCHLORIDE 20 MG/ML
INJECTION, SOLUTION EPIDURAL; INFILTRATION; INTRACAUDAL; PERINEURAL PRN
Status: DISCONTINUED | OUTPATIENT
Start: 2021-06-03 | End: 2021-06-03 | Stop reason: SURG

## 2021-06-03 RX ORDER — OXYCODONE HCL 5 MG/5 ML
5 SOLUTION, ORAL ORAL
Status: COMPLETED | OUTPATIENT
Start: 2021-06-03 | End: 2021-06-03

## 2021-06-03 RX ORDER — BUPIVACAINE HYDROCHLORIDE 5 MG/ML
INJECTION, SOLUTION EPIDURAL; INTRACAUDAL
Status: DISCONTINUED | OUTPATIENT
Start: 2021-06-03 | End: 2021-06-03 | Stop reason: HOSPADM

## 2021-06-03 RX ORDER — MIDAZOLAM HYDROCHLORIDE 1 MG/ML
1 INJECTION INTRAMUSCULAR; INTRAVENOUS
Status: DISCONTINUED | OUTPATIENT
Start: 2021-06-03 | End: 2021-06-03 | Stop reason: HOSPADM

## 2021-06-03 RX ORDER — MEPERIDINE HYDROCHLORIDE 25 MG/ML
25 INJECTION INTRAMUSCULAR; INTRAVENOUS; SUBCUTANEOUS
Status: DISCONTINUED | OUTPATIENT
Start: 2021-06-03 | End: 2021-06-03 | Stop reason: HOSPADM

## 2021-06-03 RX ORDER — GLYCOPYRROLATE 0.2 MG/ML
INJECTION INTRAMUSCULAR; INTRAVENOUS PRN
Status: DISCONTINUED | OUTPATIENT
Start: 2021-06-03 | End: 2021-06-03 | Stop reason: SURG

## 2021-06-03 RX ORDER — DEXAMETHASONE SODIUM PHOSPHATE 4 MG/ML
INJECTION, SOLUTION INTRA-ARTICULAR; INTRALESIONAL; INTRAMUSCULAR; INTRAVENOUS; SOFT TISSUE PRN
Status: DISCONTINUED | OUTPATIENT
Start: 2021-06-03 | End: 2021-06-03 | Stop reason: SURG

## 2021-06-03 RX ORDER — SODIUM CHLORIDE, SODIUM LACTATE, POTASSIUM CHLORIDE, CALCIUM CHLORIDE 600; 310; 30; 20 MG/100ML; MG/100ML; MG/100ML; MG/100ML
INJECTION, SOLUTION INTRAVENOUS CONTINUOUS
Status: CANCELLED | OUTPATIENT
Start: 2021-06-03 | End: 2021-06-03

## 2021-06-03 RX ORDER — ONDANSETRON 2 MG/ML
4 INJECTION INTRAMUSCULAR; INTRAVENOUS
Status: DISCONTINUED | OUTPATIENT
Start: 2021-06-03 | End: 2021-06-03 | Stop reason: HOSPADM

## 2021-06-03 RX ORDER — SODIUM CHLORIDE, SODIUM LACTATE, POTASSIUM CHLORIDE, CALCIUM CHLORIDE 600; 310; 30; 20 MG/100ML; MG/100ML; MG/100ML; MG/100ML
INJECTION, SOLUTION INTRAVENOUS CONTINUOUS
Status: DISCONTINUED | OUTPATIENT
Start: 2021-06-03 | End: 2021-06-03 | Stop reason: HOSPADM

## 2021-06-03 RX ORDER — HYDROMORPHONE HYDROCHLORIDE 1 MG/ML
0.1 INJECTION, SOLUTION INTRAMUSCULAR; INTRAVENOUS; SUBCUTANEOUS
Status: DISCONTINUED | OUTPATIENT
Start: 2021-06-03 | End: 2021-06-03 | Stop reason: HOSPADM

## 2021-06-03 RX ORDER — CEFAZOLIN SODIUM 1 G/3ML
INJECTION, POWDER, FOR SOLUTION INTRAMUSCULAR; INTRAVENOUS PRN
Status: DISCONTINUED | OUTPATIENT
Start: 2021-06-03 | End: 2021-06-03 | Stop reason: SURG

## 2021-06-03 RX ORDER — KETOROLAC TROMETHAMINE 30 MG/ML
INJECTION, SOLUTION INTRAMUSCULAR; INTRAVENOUS PRN
Status: DISCONTINUED | OUTPATIENT
Start: 2021-06-03 | End: 2021-06-03 | Stop reason: SURG

## 2021-06-03 RX ORDER — ONDANSETRON 2 MG/ML
INJECTION INTRAMUSCULAR; INTRAVENOUS PRN
Status: DISCONTINUED | OUTPATIENT
Start: 2021-06-03 | End: 2021-06-03 | Stop reason: SURG

## 2021-06-03 RX ORDER — HYDROMORPHONE HYDROCHLORIDE 1 MG/ML
0.5 INJECTION, SOLUTION INTRAMUSCULAR; INTRAVENOUS; SUBCUTANEOUS
Status: DISCONTINUED | OUTPATIENT
Start: 2021-06-03 | End: 2021-06-03 | Stop reason: HOSPADM

## 2021-06-03 RX ORDER — HYDROMORPHONE HYDROCHLORIDE 1 MG/ML
0.2 INJECTION, SOLUTION INTRAMUSCULAR; INTRAVENOUS; SUBCUTANEOUS
Status: DISCONTINUED | OUTPATIENT
Start: 2021-06-03 | End: 2021-06-03 | Stop reason: HOSPADM

## 2021-06-03 RX ORDER — OXYCODONE AND ACETAMINOPHEN 7.5; 325 MG/1; MG/1
1 TABLET ORAL EVERY 4 HOURS PRN
COMMUNITY

## 2021-06-03 RX ORDER — OXYCODONE HCL 5 MG/5 ML
10 SOLUTION, ORAL ORAL
Status: COMPLETED | OUTPATIENT
Start: 2021-06-03 | End: 2021-06-03

## 2021-06-03 RX ORDER — LIDOCAINE HYDROCHLORIDE 10 MG/ML
INJECTION, SOLUTION INFILTRATION; PERINEURAL
Status: DISCONTINUED | OUTPATIENT
Start: 2021-06-03 | End: 2021-06-03 | Stop reason: HOSPADM

## 2021-06-03 RX ORDER — ONDANSETRON 4 MG/1
4 TABLET, ORALLY DISINTEGRATING ORAL EVERY 6 HOURS PRN
COMMUNITY

## 2021-06-03 RX ORDER — IPRATROPIUM BROMIDE AND ALBUTEROL SULFATE 2.5; .5 MG/3ML; MG/3ML
3 SOLUTION RESPIRATORY (INHALATION)
Status: DISCONTINUED | OUTPATIENT
Start: 2021-06-03 | End: 2021-06-03 | Stop reason: HOSPADM

## 2021-06-03 RX ORDER — DIPHENHYDRAMINE HYDROCHLORIDE 50 MG/ML
12.5 INJECTION INTRAMUSCULAR; INTRAVENOUS
Status: DISCONTINUED | OUTPATIENT
Start: 2021-06-03 | End: 2021-06-03 | Stop reason: HOSPADM

## 2021-06-03 RX ADMIN — LIDOCAINE HYDROCHLORIDE 80 MG: 20 INJECTION, SOLUTION EPIDURAL; INFILTRATION; INTRACAUDAL; PERINEURAL at 12:38

## 2021-06-03 RX ADMIN — OXYCODONE HYDROCHLORIDE 10 MG: 5 SOLUTION ORAL at 16:00

## 2021-06-03 RX ADMIN — KETOROLAC TROMETHAMINE 30 MG: 30 INJECTION, SOLUTION INTRAMUSCULAR at 13:11

## 2021-06-03 RX ADMIN — FENTANYL CITRATE 50 MCG: 50 INJECTION, SOLUTION INTRAMUSCULAR; INTRAVENOUS at 16:00

## 2021-06-03 RX ADMIN — FENTANYL CITRATE 50 MCG: 50 INJECTION, SOLUTION INTRAMUSCULAR; INTRAVENOUS at 16:38

## 2021-06-03 RX ADMIN — FENTANYL CITRATE 50 MCG: 50 INJECTION, SOLUTION INTRAMUSCULAR; INTRAVENOUS at 15:05

## 2021-06-03 RX ADMIN — FENTANYL CITRATE 50 MCG: 50 INJECTION, SOLUTION INTRAMUSCULAR; INTRAVENOUS at 14:28

## 2021-06-03 RX ADMIN — FENTANYL CITRATE 50 MCG: 50 INJECTION, SOLUTION INTRAMUSCULAR; INTRAVENOUS at 16:22

## 2021-06-03 RX ADMIN — GLYCOPYRROLATE 0.4 MG: 0.2 INJECTION INTRAMUSCULAR; INTRAVENOUS at 14:51

## 2021-06-03 RX ADMIN — DEXAMETHASONE SODIUM PHOSPHATE 4 MG: 4 INJECTION, SOLUTION INTRAMUSCULAR; INTRAVENOUS at 12:45

## 2021-06-03 RX ADMIN — SUGAMMADEX 170 MG: 100 INJECTION, SOLUTION INTRAVENOUS at 15:05

## 2021-06-03 RX ADMIN — HYDROMORPHONE HYDROCHLORIDE 0.2 MG: 1 INJECTION, SOLUTION INTRAMUSCULAR; INTRAVENOUS; SUBCUTANEOUS at 17:29

## 2021-06-03 RX ADMIN — MIDAZOLAM HYDROCHLORIDE 2 MG: 1 INJECTION, SOLUTION INTRAMUSCULAR; INTRAVENOUS at 12:33

## 2021-06-03 RX ADMIN — PROPOFOL 200 MG: 10 INJECTION, EMULSION INTRAVENOUS at 12:38

## 2021-06-03 RX ADMIN — FENTANYL CITRATE 100 MCG: 50 INJECTION, SOLUTION INTRAMUSCULAR; INTRAVENOUS at 12:38

## 2021-06-03 RX ADMIN — ROCURONIUM BROMIDE 50 MG: 10 INJECTION INTRAVENOUS at 12:38

## 2021-06-03 RX ADMIN — CEFAZOLIN 2 G: 1 INJECTION, POWDER, FOR SOLUTION INTRAVENOUS at 12:33

## 2021-06-03 RX ADMIN — ONDANSETRON 4 MG: 2 INJECTION INTRAMUSCULAR; INTRAVENOUS at 12:45

## 2021-06-03 ASSESSMENT — PAIN DESCRIPTION - PAIN TYPE
TYPE: SURGICAL PAIN

## 2021-06-03 ASSESSMENT — PAIN SCALES - GENERAL: PAIN_LEVEL: 5

## 2021-06-03 NOTE — OR SURGEON
Immediate Post OP Note    PreOp Diagnosis: Hallux valgus with a Tailor's bunion right foot      PostOp Diagnosis: Hallux valgus with a Tailor's bunion right foot        Procedure(s):  FUSION, MTP JOINT, FIRST - Wound Class: Clean  BUNIONECTOMY - TAILORS - Wound Class: Clean    Surgeon(s):  Jesus Lafleur D.P.M.    Anesthesiologist/Type of Anesthesia:  Anesthesiologist: Jake Richardson M.D./General    Surgical Staff:  Circulator: Airam Vicente R.N.  Scrub Person: Laureano Valenzuela    Specimens removed if any:  * No specimens in log *    Estimated Blood Loss: <3 mL    Findings: see op report     Complications: None        6/3/2021 3:38 PM Jesus Lafleur D.P.M.

## 2021-06-03 NOTE — ANESTHESIA PROCEDURE NOTES
Airway  Performed by: Jake Richardson M.D.  Authorized by: Jake Richardson M.D.     Location:  OR  Urgency:  Elective  Indications for Airway Management:  Anesthesia      Spontaneous Ventilation: absent    Sedation Level:  Deep  Preoxygenated: Yes    Final Airway Type:  Supraglottic airway  Final Supraglottic Airway:  Standard LMA    SGA Size:  4  Number of Attempts at Approach:  1

## 2021-06-03 NOTE — OR NURSING
1533: To PACU from OR via gurney, sleeping, respirations spontaneous and non-labored via LMA.   1535: LMA dc'd. Icepack applied over c/d/i right foot surgical dressings.   1545: pt restless, drifts in and out of sleep easily. Pleasant and re-orientable.  1555: pt complaining of pain in right foot, plan analgesia.  1600: pt resting, tolerating sips PO, trying to use urinal.  1615: x-ray at bedside, pt tolerating movement for x-rays, pt complaining of pain, plan further analgesia.   1630: pt resting,   1635: complaining of pain, plan further analgesia  1640: pt complaining of nausea, vomited approximately 400mL, declines anti-nausea medication, states he feels much better  1645: pt states he feels much better after vomiting, refusing medication, queaseease given to pt to help with mild nausea.  1700: No change in surgical site assessment. Pt states pain is tolerable. Meets criteria to transfer to Stage 2. Report to Smiley ZIMMER.

## 2021-06-03 NOTE — ANESTHESIA POSTPROCEDURE EVALUATION
Patient: Jaylen Mckeon    Procedure Summary     Date: 06/03/21 Room / Location:  OR 03 / SURGERY [facility]    Anesthesia Start: 1233 Anesthesia Stop: 1534    Procedures:       FUSION, MTP JOINT, FIRST (Right Foot)      BUNIONECTOMY - TAILORS (Right Foot) Diagnosis: (HALLUX VALGUS ACQUIRED AND BUNIONETTE OF RIGHT FOOT)    Surgeons: Jesus Lafleur D.P.M. Responsible Provider: Jake Richardson M.D.    Anesthesia Type: general ASA Status: 2          Final Anesthesia Type: general  Last vitals  BP   Blood Pressure: 127/77    Temp   36.5 °C (97.7 °F)    Pulse   81   Resp   15    SpO2   98 %      Anesthesia Post Evaluation    Patient location during evaluation: PACU  Patient participation: complete - patient participated  Level of consciousness: awake and alert  Pain score: 5    Airway patency: patent  Anesthetic complications: no  Cardiovascular status: hemodynamically stable  Respiratory status: acceptable  Hydration status: euvolemic    PONV: none          No complications documented.     Nurse Pain Score: 0 (NPRS)

## 2021-06-03 NOTE — OP REPORT
PREOPERATIVE DIAGNOSIS:  Painful hallux valgus, Tailors bunion right foot.       POSTOPERATIVE DIAGNOSIS:  Painful hallux valgus, Tailors bunion right foot.       PROCEDURE PERFORMED:  Complex midfoot fusion with a Lapiplasty system.  Tailor's bunionectomy right foot      SURGEON:  EDWIN Lafleur DPM    ANESTHESIOLOGIST:  Jake Richardson MD     ANESTHESIA TYPE:  General anesthesia with a local infiltration of a total of   20 mL of a 1% lidocaine plain and 0.5% Marcaine plain in a 1:1 mixture.       HEMOSTASIS:  Pneumatic ankle tourniquet set at 250 mmHg x115 minutes.       INJECTABLES:  None     MATERIALS USED:    Two plates and a total of 9 screws from Lapiplasty system,   One 2.4 x 16 mm cannulated screw  3-0, 4-0 Vicryl, and 4-0 nylon.       ESTIMATED BLOOD LOSS:  Approximately 3 mL.      SPECIMENS:  None.       CONDITION:  Stable.       INDICATIONS FOR PROCEDURE:  Jaylen Mckeon is a 25 y.o. male patient who presented   to my office complaining of bunion pain.  We discussed many conservative and   surgical options at that time, he had already tried several other   conservative options which consisted of wider shoes and some over-the-counter   inserts.  We discussed custom orthotics and splinting, but because of the   severity of the deformity,  he wanted to pursue surgical intervention.  X-rays   were taken at that time and was noted to have an increased 1st intermetatarsal angle with rotation of   the sesamoid apparatus.  We discussed the Lapiplasty system and we   discussed the benefits of this procedure and also the potential complications.  @ expressed understanding.  No promises or guarantees were given, nor were   they implied.  Today, the n.p.o. status was confirmed and the consent form   was signed, reviewed and placed in the patient's chart.       DESCRIPTION OF PROCEDURE:  Preoperatively, he received 2 grams of Ancef.    Under mild sedation, he was brought to the operating room and placed on  the   operating table in supine position.  Following general anesthesia, a tourniquet was applied on the right calf.  The foot was scrubbed, prepped and draped in the usual aseptic manner.  A critical pause   was performed to identify the correct patient, surgical site, and procedure.    All OR staff and surgeon were in agreement.  The Esmarch bandage was then utilized to   exsanguinate the right foot and the tourniquet was then inflated.       Attention was then turned to the patient's right first metatarsal cuneiform   joint where approximately a 5 cm linear incision was placed dorsally over this   joint and incision was placed just medial to the extensor hallucis longus   tendon.  The incision was then deepened through the subcutaneous tissues,   utilizing both sharp and blunt dissection.  Care was taken to visualize and   retract all vital neural and vascular structures.  There was a vein right in   this surgical field and I attempted to salvage this vein; however, it inhibited my exposure   so I tied and cauterized the vessel.  Dissection continued down to the level of the capsular and   periosteal tissue.  At this point, a linear incision was made through the   periosteum and capsular structures.  The tissue was then reflected in order to   expose the first metatarsal cuneiform joint.  The saw blade was used to   smooth the actual joint and it was done in a dorsal plantar direction to free   up any adhesions of the first metatarsal cuneiform joint.  This allowed for   easy rotational correction of the metatarsal to bring the sesamoids to a more   corrected position.  Attention was then directed to the lateral aspect of the   head of the first metatarsal where a small incision was made in order to   perform the lateral release and assess the lateral sesamoid suspensory   ligament.  Once I was satisfied with this procedure, I then   reverted my attention back to the first metatarsal cuneiform joint.        Utilizing proper technique, the Lapiplasty system was utilized to reduce the   intermetatarsal angle as well as rotate the first metatarsal while attempting   to maintain as much of the first metatarsal length as possible.  Fluoroscopy   was utilized throughout this procedure to verify its position and correction.    I did fenestrate the base of the first metatarsal and the head of the   cuneiform to provide bleeding bone to promote fusion.  Once I was satisfied   with this correction, 2 threaded olive wires were inserted across the first   metatarsal cuneiform joint to maintain this correction of the plates and   screws and then placed in proper position to provide permanent fixation.  I   then performed a stability test by placing my thumb and index finger in the   first webspace and squeezed under fluoroscopy, there was   diastasis between the first and second cuneiforms with this test.  Therefore,   I did introduce an interfrag screw across the medial cuneiform into the   intermediate cuneiform.       The surgical site was then flushed with copious amounts of sterile normal   saline.  The periosteal and capsular structures were reapproximated utilizing   3-0 Vicryl and the subcutaneous tissues were reapproximated utilizing 4-0   Vicryl.  The skin edges were reapproximated utilizing 4-0 nylon in a running   interlocking suture technique.      I then directed my attention to the lateral aspect of the right foot over the 5th   Metatarsal phalangeal joint.  An incision was made over the 5th MTPJ just lateral   to the extensor digitorum longus tendon and continued through the subcutaneous   tissue.  A capsulotomy was performed and the medial and lateral borders were   reflected to visualize the head of the 5th metatarsal.  I used a K-wire as a guide   pin for a chevron osteotomy where the apex was pointing distally.  I was then able   to distract the head and move it medially into a more corrected position.  The  guide   wire was removed and then used to provide temporary fixation across the osteotomy   site.  Utilizing proper technique a 2.4 x 16mm cannulated screw was driven across   the osteotomy site to provide permanent fixation.  The remaining k-wires were all   removed.  I then flushed the wound with copious amounts of sterile normal saline   and re approximated the joint capsule with 3-0 vicryl and the subcutaneous tissue   with 4-0 vicryl.  The skin edges were reapproximated with 4-0 nylon.  The tourniquet   was deflated after the chevron osteotomy was performed at 115 minutes.  The rest   of the procedure was completed with the tourniquet down.      The incisions were then dressed with Xeroform, 4x4, Zuleika and soft roll.  The   pneumatic ankle tourniquet was then deflated and a prompt hyperemic response   was noted to all aspects of the right foot.  A layer of Coban was then lightly   placed around the dressing.       The patient tolerated the procedure and anesthesia well. He was then   transferred from the operating room to the recovery room with vital signs   stable and vascular status intact.  Following a period of postoperative   monitoring, the patient will be discharged home with the following written and   oral postoperative instructions:     1.  The patient is to keep the dressing clean, dry, and intact.  If the   dressing becomes wet or soiled, he is to contact the office immediately.    2. He was given a postoperative boot to help provide protection to the   surgical site. He is to remain nonweightbearing for 3 days to help with soft   tissue stabilization, at which point he can start to put some weight to the   right foot.    3.   He was not given a prescription because he already has some for some   previous back injuries and so he was instructed to take his Norco that he   already has for his postoperative pain.    4. He was given instructions for icing and elevation to help with   postoperative pain  and swelling.    5. He is to follow up in my office in 1 week, but can call with any other   questions or concerns.

## 2021-06-03 NOTE — DISCHARGE INSTRUCTIONS
ACTIVITY: Rest and take it easy for the first 24 hours.  A responsible adult is recommended to remain with you during that time.  It is normal to feel sleepy.  We encourage you to not do anything that requires balance, judgment or coordination.    MILD FLU-LIKE SYMPTOMS ARE NORMAL. YOU MAY EXPERIENCE GENERALIZED MUSCLE ACHES, THROAT IRRITATION, HEADACHE AND/OR SOME NAUSEA.    FOR 24 HOURS DO NOT:  Drive, operate machinery or run household appliances.  Drink beer or alcoholic beverages.   Make important decisions or sign legal documents.    SPECIAL INSTRUCTIONS: Ice and elevate right foot for 48 hours.     DIET: To avoid nausea, slowly advance diet as tolerated, avoiding spicy or greasy foods for the first day.  Add more substantial food to your diet according to your physician's instructions.  Babies can be fed formula or breast milk as soon as they are hungry.  INCREASE FLUIDS AND FIBER TO AVOID CONSTIPATION.    SURGICAL DRESSING/BATHING: Keep dressing clean, dry and intact until instructed otherwise by surgeon    FOLLOW-UP APPOINTMENT:  A follow-up appointment previously scheduled for June 9th.    You should CALL YOUR PHYSICIAN if you develop:  Fever greater than 101 degrees F.  Pain not relieved by medication, or persistent nausea or vomiting.  Excessive bleeding (blood soaking through dressing) or unexpected drainage from the wound.  Extreme redness or swelling around the incision site, drainage of pus or foul smelling drainage.  Inability to urinate or empty your bladder within 8 hours.  Problems with breathing or chest pain.    You should call 911 if you develop problems with breathing or chest pain.  If you are unable to contact your doctor or surgical center, you should go to the nearest emergency room or urgent care center.  Physician's telephone #: Dr. Lafleur [phone]    If any questions arise, call your doctor.  If your doctor is not available, please feel free to call the Surgical Center at  [phone]. The Contact Center is open Monday through Friday 7AM to 5PM and may speak to a nurse at [phone], or toll free at [phone].     A registered nurse may call you a few days after your surgery to see how you are doing after your procedure.    MEDICATIONS: Resume taking daily medication.  Take prescribed pain medication with food.  If no medication is prescribed, you may take non-aspirin pain medication if needed.  PAIN MEDICATION CAN BE VERY CONSTIPATING.  Take a stool softener or laxative such as senokot, pericolace, or milk of magnesia if needed.    Prescription given prior to surgery.  Last pain medication given at 4:00pm 10mg oxycodone.    If your physician has prescribed pain medication that includes Acetaminophen (Tylenol), do not take additional Acetaminophen (Tylenol) while taking the prescribed medication.    Depression / Suicide Risk    As you are discharged from this [facility] facility, it is important to learn how to keep safe from harming yourself.    Recognize the warning signs:  · Abrupt changes in personality, positive or negative- including increase in energy   · Giving away possessions  · Change in eating patterns- significant weight changes-  positive or negative  · Change in sleeping patterns- unable to sleep or sleeping all the time   · Unwillingness or inability to communicate  · Depression  · Unusual sadness, discouragement and loneliness  · Talk of wanting to die  · Neglect of personal appearance   · Rebelliousness- reckless behavior  · Withdrawal from people/activities they love  · Confusion- inability to concentrate     If you or a loved one observes any of these behaviors or has concerns about self-harm, here's what you can do:  · Talk about it- your feelings and reasons for harming yourself  · Remove any means that you might use to hurt yourself (examples: pills, rope, extension cords, firearm)  · Get professional help from the community (Mental Health,  Substance Abuse, psychological counseling)  · Do not be alone:Call your Safe Contact- someone whom you trust who will be there for you.  · Call your local CRISIS HOTLINE 136-4959 or 919-934-0393  · Call your local Children's Mobile Crisis Response Team [place] (623) 983-3863 or www.[employer]  · Call the toll free National Suicide Prevention Hotlines   · National Suicide Prevention Lifeline 769-109-WBWY (6144)  · National Hope Line Network 800-SUICIDE (280-5350)

## 2021-06-03 NOTE — ANESTHESIA TIME REPORT
Anesthesia Start and Stop Event Times     Date Time Event    6/3/2021 1227 Ready for Procedure     1233 Anesthesia Start     1534 Anesthesia Stop        Responsible Staff  06/03/21    Name Role Begin End    Jake Richardson M.D. Anesth 1233 1534        Preop Diagnosis (Free Text):  Pre-op Diagnosis     HALLUX VALGUS ACQUIRED AND BUNIONETTE OF RIGHT FOOT        Preop Diagnosis (Codes):    Post op Diagnosis  Hallux valgus      Premium Reason  A. 3PM - 7AM    Comments:

## 2021-06-04 ENCOUNTER — HOSPITAL ENCOUNTER (EMERGENCY)
Facility: MEDICAL CENTER | Age: 26
End: 2021-06-04
Attending: EMERGENCY MEDICINE
Payer: COMMERCIAL

## 2021-06-04 ENCOUNTER — NURSE TRIAGE (OUTPATIENT)
Dept: HEALTH INFORMATION MANAGEMENT | Facility: OTHER | Age: 26
End: 2021-06-04

## 2021-06-04 VITALS
RESPIRATION RATE: 18 BRPM | OXYGEN SATURATION: 95 % | HEART RATE: 78 BPM | SYSTOLIC BLOOD PRESSURE: 124 MMHG | HEIGHT: 69 IN | WEIGHT: 187.39 LBS | TEMPERATURE: 98.6 F | BODY MASS INDEX: 27.76 KG/M2 | DIASTOLIC BLOOD PRESSURE: 72 MMHG

## 2021-06-04 VITALS
HEIGHT: 69 IN | OXYGEN SATURATION: 97 % | DIASTOLIC BLOOD PRESSURE: 70 MMHG | HEART RATE: 70 BPM | WEIGHT: 186.73 LBS | BODY MASS INDEX: 27.66 KG/M2 | RESPIRATION RATE: 18 BRPM | SYSTOLIC BLOOD PRESSURE: 122 MMHG | TEMPERATURE: 98.4 F

## 2021-06-04 DIAGNOSIS — R35.0 URINARY FREQUENCY: ICD-10-CM

## 2021-06-04 DIAGNOSIS — R33.9 URINARY RETENTION: ICD-10-CM

## 2021-06-04 DIAGNOSIS — F41.1 GENERALIZED ANXIETY DISORDER: ICD-10-CM

## 2021-06-04 DIAGNOSIS — R33.8 ACUTE URINARY RETENTION: ICD-10-CM

## 2021-06-04 LAB
ANION GAP SERPL CALC-SCNC: 12 MMOL/L (ref 7–16)
APPEARANCE UR: CLEAR
BILIRUB UR QL STRIP.AUTO: NEGATIVE
BUN SERPL-MCNC: 17 MG/DL (ref 8–22)
CALCIUM SERPL-MCNC: 8.9 MG/DL (ref 8.4–10.2)
CHLORIDE SERPL-SCNC: 105 MMOL/L (ref 96–112)
CO2 SERPL-SCNC: 21 MMOL/L (ref 20–33)
COLOR UR: YELLOW
CREAT SERPL-MCNC: 0.97 MG/DL (ref 0.5–1.4)
GLUCOSE SERPL-MCNC: 92 MG/DL (ref 65–99)
GLUCOSE UR STRIP.AUTO-MCNC: NEGATIVE MG/DL
KETONES UR STRIP.AUTO-MCNC: 15 MG/DL
LEUKOCYTE ESTERASE UR QL STRIP.AUTO: NEGATIVE
MICRO URNS: ABNORMAL
NITRITE UR QL STRIP.AUTO: NEGATIVE
PH UR STRIP.AUTO: 7 [PH] (ref 5–8)
POTASSIUM SERPL-SCNC: 4.2 MMOL/L (ref 3.6–5.5)
PROT UR QL STRIP: NEGATIVE MG/DL
RBC UR QL AUTO: NEGATIVE
SODIUM SERPL-SCNC: 138 MMOL/L (ref 135–145)
SP GR UR STRIP.AUTO: 1.02

## 2021-06-04 PROCEDURE — A9270 NON-COVERED ITEM OR SERVICE: HCPCS | Performed by: EMERGENCY MEDICINE

## 2021-06-04 PROCEDURE — 81003 URINALYSIS AUTO W/O SCOPE: CPT

## 2021-06-04 PROCEDURE — 51702 INSERT TEMP BLADDER CATH: CPT

## 2021-06-04 PROCEDURE — 80048 BASIC METABOLIC PNL TOTAL CA: CPT

## 2021-06-04 PROCEDURE — 99283 EMERGENCY DEPT VISIT LOW MDM: CPT

## 2021-06-04 PROCEDURE — 303105 HCHG CATHETER EXTRA

## 2021-06-04 PROCEDURE — 700102 HCHG RX REV CODE 250 W/ 637 OVERRIDE(OP): Performed by: EMERGENCY MEDICINE

## 2021-06-04 PROCEDURE — 700101 HCHG RX REV CODE 250

## 2021-06-04 RX ORDER — TAMSULOSIN HYDROCHLORIDE 0.4 MG/1
0.4 CAPSULE ORAL ONCE
Status: COMPLETED | OUTPATIENT
Start: 2021-06-04 | End: 2021-06-04

## 2021-06-04 RX ORDER — TAMSULOSIN HYDROCHLORIDE 0.4 MG/1
0.4 CAPSULE ORAL DAILY
Qty: 14 CAPSULE | Refills: 0 | Status: SHIPPED | OUTPATIENT
Start: 2021-06-04 | End: 2021-06-08 | Stop reason: SDUPTHER

## 2021-06-04 RX ORDER — OXYCODONE HYDROCHLORIDE AND ACETAMINOPHEN 5; 325 MG/1; MG/1
1 TABLET ORAL ONCE
Status: COMPLETED | OUTPATIENT
Start: 2021-06-04 | End: 2021-06-04

## 2021-06-04 RX ORDER — LIDOCAINE HYDROCHLORIDE 20 MG/ML
JELLY TOPICAL
Status: COMPLETED
Start: 2021-06-04 | End: 2021-06-04

## 2021-06-04 RX ADMIN — LIDOCAINE HYDROCHLORIDE 200 MG: 20 JELLY TOPICAL at 17:11

## 2021-06-04 RX ADMIN — TAMSULOSIN HYDROCHLORIDE 0.4 MG: 0.4 CAPSULE ORAL at 01:44

## 2021-06-04 RX ADMIN — OXYCODONE HYDROCHLORIDE AND ACETAMINOPHEN 1 TABLET: 5; 325 TABLET ORAL at 17:10

## 2021-06-04 NOTE — ED TRIAGE NOTES
Pt was seen in our department early this AM, and treated for urinary retention with a Fontana placement, and removal prior to discharge.  He had a foot surgery yesterday. He returns complaining of inability to urinate.  Chief Complaint   Patient presents with   • Urinary Retention     Chief Complaint   Patient presents with   • Urinary Retention

## 2021-06-04 NOTE — DISCHARGE INSTRUCTIONS
You were seen and evaluated in the Emergency Department at [facility] for:     Inability to pee.  This is probably a reaction to the anesthesia from your surgery earlier today, we drained your bladder and your kidney function is normal, but if this recurs you need to come back and have a Fontana catheter placed for 1 week.    You had the following tests and studies:    Thankfully there is no signs of urine infection, and your kidney function is normal.    You received the following medications:    Flomax/tamsulosin.    You received the following prescriptions:    Flomax/tamsulosin, take every evening for 2 weeks.  ----------------------------    Please make sure to follow up with:    Primary care provider, surgeon, and urology.  If you have any inability to urinate or fevers or flank pain or any other concerns come back to the hospital immediately.    Good luck, we hope you get better soon!  ----------------------------    We always encourage patients to return IMMEDIATELY if they have:  Increased or changing pain, passing out, fevers over 100.4 (taken in your mouth or rectally) for more than 2 days, redness or swelling of skin or tissues, feeling like your heart is beating fast, chest pain that is new or worsening, trouble breathing, feeling like your throat is closing up and can not breath, inability to walk, weakness of any part of your body, new dizziness, severe bleeding that won't stop from any part of your body, if you can't eat or drink, or if you have any other concerns.   If you feel worse, please know that you can always return with any questions, concerns, worse symptoms, or you are feeling unsafe. We certainly cannot say for sure that we have ruled out every illness or dangerous disease, but we feel that at this specific time, your exam, tests, and vital signs like heart rate and blood pressure are safe for discharge.

## 2021-06-04 NOTE — OR NURSING
1725 medicated for c/o pain, pt also c/o mild nausea-decline medication for nausea    1745 states pain level better, meets discharge criteria    1750 discharged home in stable condition via w/c to car

## 2021-06-04 NOTE — TELEPHONE ENCOUNTER
"Patient had right foot surgery yesterday with Dr Lafleur. Patient unable to urinate, went to ED  Last night was started on Flomax still unable to urinate advised to return to ED.     Reason for Disposition  • [1] Unable to urinate (or only a few drops) > 4 hours AND     [2] bladder feels very full (e.g., palpable bladder or strong urge to urinate)    Additional Information  • Negative: Shock suspected (e.g., cold/pale/clammy skin, too weak to stand, low BP, rapid pulse)  • Negative: Sounds like a life-threatening emergency to the triager  • Negative: Followed a genital area injury  • Negative: Followed a genital area injury (penis, scrotum)  • Negative: Vaginal discharge  • Negative: Pus (white, yellow) or bloody discharge from end of penis  • Negative: [1] Taking antibiotic for urinary tract infection (UTI) AND [2] female  • Negative: [1] Taking antibiotic for urinary tract infection (UTI) AND [2] male  • Negative: [1] Discomfort (pain, burning or stinging) when passing urine AND [2] pregnant  • Negative: [1] Discomfort (pain, burning or stinging) when passing urine AND [2] postpartum (from 0 to 6 weeks after delivery)  • Negative: [1] Discomfort (pain, burning or stinging) when passing urine AND [2] female  • Negative: [1] Discomfort (pain, burning or stinging) when passing urine AND [2] male  • Negative: Pain or itching in the vulvar area  • Negative: Pain in scrotum is main symptom  • Negative: Blood in the urine is main symptom  • Negative: Symptoms arising from use of a urinary catheter (Fontana or Coude)    Answer Assessment - Initial Assessment Questions  1. SYMPTOM: \"What's the main symptom you're concerned about?\" (e.g., frequency, incontinence)      retention  2. ONSET: \"When did the  RETENTION  start?\"      Yesterday after surgery  3. PAIN: \"Is there any pain?\" If so, ask: \"How bad is it?\" (Scale: 1-10; mild, moderate, severe)      yes  4. CAUSE: \"What do you think is causing the symptoms?\"      " "surgery  5. OTHER SYMPTOMS: \"Do you have any other symptoms?\" (e.g., fever, flank pain, blood in urine, pain with urination)      unknown  6. PREGNANCY: \"Is there any chance you are pregnant?\" \"When was your last menstrual period?\"      na    Protocols used: URINARY SYMPTOMS-A-AH      "

## 2021-06-04 NOTE — ED PROVIDER NOTES
"ED Provider Note    ER PROVIDER NOTE      CHIEF COMPLAINT  Chief Complaint   Patient presents with   • Urinary Retention       HPI  Jaylen Mckeon is a 25 y.o. male who presents to the emergency department complaining of suprapubic pressure and urinary retention.  Patient had recent foot surgery yesterday morning, and underwent general anesthesia.  He was not able to urinate after the surgery, and was discharged, went home, he then returned to the emergency department early this morning with urinary retention, had catheter placed but was then removed.  He then reports he is still not urinated ever since leaving the emergency department this morning.  Reports worsening suprapubic fullness.  No fevers or chills or nausea or vomiting.    REVIEW OF SYSTEMS  Pertinent positives include urinary retention. Pertinent negatives include no fever. See HPI for details. All other systems reviewed and are negative.    PAST MEDICAL HISTORY   has a past medical history of Ingrown left big toenail (2/25/2019) and Ingrown right big toenail (2/25/2019).    SOCIAL HISTORY  Social History     Tobacco Use   • Smoking status: Never Smoker   • Smokeless tobacco: Never Used   Vaping Use   • Vaping Use: Never used   Substance Use Topics   • Alcohol use: No   • Drug use: Yes     Types: Marijuana, Inhaled     Comment: Marijuana daily       SURGICAL HISTORY   has a past surgical history that includes orchiopexy adult (Bilateral); fusion big toe,mt-p jt (Right, 6/3/2021); and bunionectomy (Right, 6/3/2021).    CURRENT MEDICATIONS  Home Medications    **Home medications have not yet been reviewed for this encounter**         ALLERGIES  Allergies   Allergen Reactions   • Ativan Anaphylaxis     Other reaction(s): OTHER   • Augmentin Hives       PHYSICAL EXAM  VITAL SIGNS: /72   Pulse 78   Temp 36.4 °C (97.5 °F) (Temporal)   Resp 18   Ht 1.753 m (5' 9\")   Wt 85 kg (187 lb 6.3 oz)   SpO2 95%   BMI 27.67 kg/m²   Pulse ox " interpretation: I interpret this pulse ox as normal.    Constitutional: Alert.  Uncomfortable appearing  HENT: Normocephalic, Atraumatic, Bilateral external ears normal. Nose normal.   Eyes: Pupils are equal and reactive. Conjunctiva normal, non-icteric.   Heart: Regular rate and rhythm, no murmurs.    Lungs: Clear to auscultation bilaterally.  Skin: Warm, Dry, No erythema, No rash.   Musculoskeletal: No tenderness or major deformities noted. No edema.  Abd: Suprapubic fullness appreciated with tenderness  Neurologic: Alert, Grossly non-focal.   Psychiatric: Affect normal, Judgment normal, Mood normal, Appears appropriate    DIAGNOSTIC STUDIES / PROCEDURES        RADIOLOGY  No orders to display     The radiologist's interpretation of all radiological studies have been reviewed and independently viewed by me.    COURSE & MEDICAL DECISION MAKING  Nursing notes, VS, PMSFHx reviewed in chart.    4:46 PM - Patient seen and examined at bedside. Patient will be treated with percocet.  Fontana catheter placed    I reviewed the patient records, he had negative urinalysis this morning, normal creatinine    Relief of symptoms after Fontana placement    Decision Making:  This is a 25 y.o. male presenting with suprapubic fullness and found to have acute urinary retention, likely with his recent surgery and general anesthesia.  This point he has already failed a Fontana catheter trial removal once, so we will leave in place and he will follow up with urology.  No apparent renal issues or concomitant infection     The patient will return for new or worsening symptoms and is stable at the time of discharge.    The patient is referred to a primary physician for blood pressure management, diabetic screening, and for all other preventative health concerns.        DISPOSITION:  Patient will be discharged home in stable condition.    FOLLOW UP:  Diego Kumar M.D.  [address] [place] [address]  [phone]    Schedule an appointment as  soon as possible for a visit         OUTPATIENT MEDICATIONS:  New Prescriptions    No medications on file         FINAL IMPRESSION  1. Urinary retention        The note accurately reflects work and decisions made by me.  Hardy Villela M.D.  6/4/2021  5:21 PM

## 2021-06-04 NOTE — ED TRIAGE NOTES
"Chief Complaint   Patient presents with   • Unable to Urinate     for 12 hours. foot surgery this morning.     ED Triage Vitals [06/03/21 6794]   Enc Vitals Group      Blood Pressure 122/70      Pulse 74      Respiration 16      Temperature 36.9 °C (98.4 °F)      Temp src Temporal      Pulse Oximetry 97 %      Weight 84.7 kg (186 lb 11.7 oz)      Height 1.753 m (5' 9\")       "

## 2021-06-05 NOTE — ED NOTES
Pt cleared for d/c  dischg instructions given to pt  Verbally understands  D/c'ed to home in NAD  Fontana cath left in placed for d/c  Instructed to f/u w/ urology on Monday  Pt verbally understands

## 2021-06-06 ENCOUNTER — HOSPITAL ENCOUNTER (EMERGENCY)
Facility: MEDICAL CENTER | Age: 26
End: 2021-06-06
Attending: EMERGENCY MEDICINE
Payer: COMMERCIAL

## 2021-06-06 ENCOUNTER — NURSE TRIAGE (OUTPATIENT)
Dept: HEALTH INFORMATION MANAGEMENT | Facility: OTHER | Age: 26
End: 2021-06-06

## 2021-06-06 VITALS
BODY MASS INDEX: 27.76 KG/M2 | DIASTOLIC BLOOD PRESSURE: 74 MMHG | OXYGEN SATURATION: 97 % | HEART RATE: 89 BPM | SYSTOLIC BLOOD PRESSURE: 110 MMHG | RESPIRATION RATE: 17 BRPM | HEIGHT: 69 IN | WEIGHT: 187.39 LBS | TEMPERATURE: 98.1 F

## 2021-06-06 DIAGNOSIS — N48.89 PENIS PAIN: ICD-10-CM

## 2021-06-06 DIAGNOSIS — Z46.6 ENCOUNTER FOR FOLEY CATHETER REMOVAL: ICD-10-CM

## 2021-06-06 LAB
APPEARANCE UR: CLEAR
BILIRUB UR QL STRIP.AUTO: NEGATIVE
COLOR UR: YELLOW
GLUCOSE UR STRIP.AUTO-MCNC: NEGATIVE MG/DL
KETONES UR STRIP.AUTO-MCNC: ABNORMAL MG/DL
LEUKOCYTE ESTERASE UR QL STRIP.AUTO: NEGATIVE
MICRO URNS: ABNORMAL
NITRITE UR QL STRIP.AUTO: NEGATIVE
PH UR STRIP.AUTO: 6 [PH] (ref 5–8)
PROT UR QL STRIP: NEGATIVE MG/DL
RBC UR QL AUTO: NEGATIVE
SP GR UR STRIP.AUTO: 1.02

## 2021-06-06 PROCEDURE — 87086 URINE CULTURE/COLONY COUNT: CPT

## 2021-06-06 PROCEDURE — 81003 URINALYSIS AUTO W/O SCOPE: CPT

## 2021-06-06 PROCEDURE — 87491 CHLMYD TRACH DNA AMP PROBE: CPT

## 2021-06-06 PROCEDURE — 99284 EMERGENCY DEPT VISIT MOD MDM: CPT

## 2021-06-06 PROCEDURE — 87591 N.GONORRHOEAE DNA AMP PROB: CPT

## 2021-06-06 RX ORDER — NITROFURANTOIN 25; 75 MG/1; MG/1
100 CAPSULE ORAL 2 TIMES DAILY
Qty: 14 CAPSULE | Refills: 0 | Status: SHIPPED | OUTPATIENT
Start: 2021-06-06 | End: 2021-06-13

## 2021-06-06 ASSESSMENT — LIFESTYLE VARIABLES
DO YOU DRINK ALCOHOL: YES
HAVE YOU EVER FELT YOU SHOULD CUT DOWN ON YOUR DRINKING: NO

## 2021-06-06 NOTE — ED TRIAGE NOTES
"Pt was seen in our department approximately 2 days ago and evaluated/treated for recurrence of suprapubic pressure and urinary retention.   Patient has a history  recent foot surgery  undergoing general anesthesia.  He was discharged from our facility in stable condition with a Fontana cath in place. C/O dysuria.   Chief Complaint   Patient presents with   • UTI   • Painful Urination   • Constipation     /86   Pulse 99   Temp 36.7 °C (98 °F) (Temporal)   Resp 20   Ht 1.753 m (5' 9\")   Wt 85 kg (187 lb 6.3 oz)   SpO2 96%   BMI 27.67 kg/m²      "

## 2021-06-06 NOTE — TELEPHONE ENCOUNTER
"Pt calling in regards to indwelling catheter and discharge from penis.  Pt has no BM for 4 days.    Scheduled UC visit for today    Reason for Disposition  • Bleeding around catheter (e.g., from penis or female urethra)    Additional Information  • Negative: Shock suspected (e.g., cold/pale/clammy skin, too weak to stand, low BP, rapid pulse)  • Negative: Sounds like a life-threatening emergency to the triager  • Negative: [1] Catheter was accidentally pulled-out AND [2] bright red continuous bleeding  • Negative: SEVERE abdominal pain  • Negative: Fever > 100.5 F (38.1 C)  • Negative: [1] Drinking very little AND [2] dehydration suspected (e.g., no urine > 12 hours, very dry mouth, very lightheaded)  • Negative: Patient sounds very sick or weak to the triager  • Negative: Catheter was accidentally pulled-out  • Negative: [1] Catheter is broken AND [2] is not usable    Answer Assessment - Initial Assessment Questions  1. SYMPTOMS: \"What symptoms are you concerned about?\"      Drainage around the insertion site of the catheter  2. ONSET:  \"When did the symptoms start?\"    This morning  3. FEVER: \"Is there a fever?\" If so, ask: \"What is the temperature, how was it measured, and when did it start?\"      no  4. ABDOMINAL PAIN: \"Is there any abdominal pain?\" (e.g., Scale 1-10; or mild, moderate, severe)      no  5. URINE COLOR: \"What color is the urine?\"  \"Is there blood present in the urine?\" (e.g., clear, yellow, cloudy, tea-colored, blood streaks, bright red)      Yellow with blood  6. ONSET: \"When was the catheter inserted?\"  today  7. OTHER SYMPTOMS: \"Do you have any other symptoms?\" (e.g., back pain, bad urine odor)       constipation  8. PREGNANCY: \"Is there any chance you are pregnant?\" \"When was your last menstrual period?\"      no    Protocols used: URINARY CATHETER SYMPTOMS AND [id number]      "

## 2021-06-06 NOTE — ED PROVIDER NOTES
ED Provider Note    CHIEF COMPLAINT  Chief Complaint   Patient presents with   • UTI   • Painful Urination   • Constipation       HPI  Jaylen Mckeon is a 25 y.o. male who presents to the emergency department requesting Fontana catheter removal.  The patient had foot surgery.  Subsequently developed urinary retention.  He was seen here and had a Fontana catheter placed.  Patient having some discomfort from the catheter and about the urethra.  He feels like there is a bit of discharge.  Catheter is draining well.  He now feels like he is able to control his urinary sphincter.  He would like the catheter removed.  Denies any flank pain.  No fevers.  No concerns with his foot or recent surgery.    REVIEW OF SYSTEMS  See HPI for further details. All other systems are negative.     PAST MEDICAL HISTORY  Past Medical History:   Diagnosis Date   • Ingrown left big toenail 2/25/2019   • Ingrown right big toenail 2/25/2019       FAMILY HISTORY  Family History   Problem Relation Age of Onset   • Sleep Apnea Neg Hx        SOCIAL HISTORY  Social History     Socioeconomic History   • Marital status: Single     Spouse name: Not on file   • Number of children: Not on file   • Years of education: Not on file   • Highest education level: Not on file   Occupational History   • Not on file   Tobacco Use   • Smoking status: Never Smoker   • Smokeless tobacco: Never Used   Vaping Use   • Vaping Use: Never used   Substance and Sexual Activity   • Alcohol use: No   • Drug use: Yes     Types: Marijuana, Inhaled     Comment: Marijuana daily   • Sexual activity: Yes     Partners: Female     Birth control/protection: Pill   Other Topics Concern   • Not on file   Social History Narrative   • Not on file     Social Determinants of Health     Financial Resource Strain:    • Difficulty of Paying Living Expenses:    Food Insecurity:    • Worried About Running Out of Food in the Last Year:    • Ran Out of Food in the Last Year:    Transportation  "Needs:    • Lack of Transportation (Medical):    • Lack of Transportation (Non-Medical):    Physical Activity:    • Days of Exercise per Week:    • Minutes of Exercise per Session:    Stress:    • Feeling of Stress :    Social Connections:    • Frequency of Communication with Friends and Family:    • Frequency of Social Gatherings with Friends and Family:    • Attends [employer] Services:    • Active Member of Clubs or Organizations:    • Attends Club or Organization Meetings:    • Marital Status:    Intimate Partner Violence:    • Fear of Current or Ex-Partner:    • Emotionally Abused:    • Physically Abused:    • Sexually Abused:        SURGICAL HISTORY  Past Surgical History:   Procedure Laterality Date   • PB FUSION BIG TOE,MT-P JT Right 6/3/2021    Procedure: FUSION, MTP JOINT, FIRST;  Surgeon: Jesus Lafleur D.P.M.;  Location: SURGERY [facility];  Service: Podiatry   • BUNIONECTOMY Right 6/3/2021    Procedure: BUNIONECTOMY - TAILORS;  Surgeon: Jesus Lafleur D.P.M.;  Location: SURGERY [facility];  Service: Podiatry   • ORCHIOPEXY ADULT Bilateral     Testicular torsions       CURRENT MEDICATIONS  Home Medications    **Home medications have not yet been reviewed for this encounter**         ALLERGIES  Allergies   Allergen Reactions   • Ativan Anaphylaxis     Other reaction(s): OTHER   • Augmentin Hives       PHYSICAL EXAM  VITAL SIGNS: /86   Pulse 99   Temp 36.7 °C (98 °F) (Temporal)   Resp 20   Ht 1.753 m (5' 9\")   Wt 85 kg (187 lb 6.3 oz)   SpO2 96%   BMI 27.67 kg/m²   Constitutional: Well developed, Well nourished, no acute distress, Non-toxic appearance.   HENT: Normocephalic, Atraumatic, Bilateral external ears normal, Oropharynx moist, No oral exudates, Nose normal.   Eyes: PER, EOMI grossly, Conjunctiva normal, No discharge.   Neck: Normal range of motion, No tenderness, Supple, No stridor.   Lymphatic: No lymphadenopathy noted.   Cardiovascular: Normal heart rate.   Thorax & " Lungs: Normal work of breathing  Abdomen: Soft, not distended, Fontana catheter in place, draining clear yellow urine.  Skin: Warm, Dry, No erythema, No rash.   Back: No tenderness, No CVA tenderness.   Extremities: Intact distal pulses, No edema, No tenderness, No cyanosis, No clubbing.   Neurologic: Alert & appropriate, Normal motor function, Normal sensory function, No focal deficits noted.       RADIOLOGY/PROCEDURES      COURSE & MEDICAL DECISION MAKING  Pertinent Labs & Imaging studies reviewed. (See chart for details)    Fontana catheter was removed.  Patient had a successful voiding trial in the emergency department.  Urine culture sent given his discomfort.  He will be started on Macrobid.  Discharged home in improved condition.    The patient will return for new or worsening symptoms and is stable at the time of discharge.    The patient is referred to a primary physician for blood pressure management, diabetic screening, and for all other preventative health concerns.    DISPOSITION:  Patient will be discharged home in stable condition.    FOLLOW UP:  Bob Pérez M.D.  [address] [facility] [address]  [facility] [id number] [phone]    Schedule an appointment as soon as possible for a visit       [facility], Emergency Dept  [address]  [facility] [id number] [phone]    If symptoms worsen      OUTPATIENT MEDICATIONS:  New Prescriptions    NITROFURANTOIN (MACROBID) 100 MG CAP    Take 1 capsule by mouth 2 times a day for 7 days.           FINAL IMPRESSION  1. Encounter for Fontana catheter removal    2. Penis pain            Electronically signed by: Thee Hardin M.D., 6/6/2021 10:10 AM

## 2021-06-07 LAB
C TRACH DNA SPEC QL NAA+PROBE: NEGATIVE
N GONORRHOEA DNA SPEC QL NAA+PROBE: NEGATIVE
SPECIMEN SOURCE: NORMAL

## 2021-06-08 ENCOUNTER — OFFICE VISIT (OUTPATIENT)
Dept: MEDICAL GROUP | Facility: LAB | Age: 26
End: 2021-06-08
Payer: COMMERCIAL

## 2021-06-08 VITALS
DIASTOLIC BLOOD PRESSURE: 68 MMHG | OXYGEN SATURATION: 94 % | SYSTOLIC BLOOD PRESSURE: 110 MMHG | TEMPERATURE: 98.6 F | HEIGHT: 68 IN | RESPIRATION RATE: 12 BRPM | BODY MASS INDEX: 29.25 KG/M2 | HEART RATE: 60 BPM | WEIGHT: 193 LBS

## 2021-06-08 DIAGNOSIS — R33.9 URINARY RETENTION: ICD-10-CM

## 2021-06-08 LAB
BACTERIA UR CULT: NORMAL
SIGNIFICANT IND 70042: NORMAL
SITE SITE: NORMAL
SOURCE SOURCE: NORMAL

## 2021-06-08 PROCEDURE — 99214 OFFICE O/P EST MOD 30 MIN: CPT | Performed by: FAMILY MEDICINE

## 2021-06-08 RX ORDER — TRAMADOL HYDROCHLORIDE 50 MG/1
50 TABLET ORAL PRN
COMMUNITY
Start: 2021-06-07

## 2021-06-08 RX ORDER — TAMSULOSIN HYDROCHLORIDE 0.4 MG/1
0.4 CAPSULE ORAL DAILY
Qty: 30 CAPSULE | Refills: 1 | Status: SHIPPED | OUTPATIENT
Start: 2021-06-08 | End: 2021-08-17

## 2021-06-08 NOTE — PROGRESS NOTES
"Subjective:     CC: Er follow up    HPI:   Jaylen presents today to follow-up on ER visit.  He has been to the ER 3 times for urinary retention after foot surgery 6/4.  Underwent general anesthesia with midfoot fusion and bunionectomy.  He was taking oxycodone for pain control.    Bedside ultrasound on arrival at the ER later that day showed very distended bladder.  Fontana was placed.  He had normal renal function.  Fontana was removed but he returned to the ER and it was replaced as he could not void at home.  Return to the ER 6/6 and Fontana was removed.  He also had some possible urethral discharge and pain was started on Macrobid.    UA, urine culture negative.  GC/CT were negative.  He has had orchiopexy x2 in [place], had torsion as a teenager.    He does have chronic nocuturia -wakes up 5-6x nightly. Difficulty starting stream as well. Reports normal prostate exam after orchiopexy in 2014, urology at the time told him to work on behavioral modifications for frequent urination.    Since Fontana removal 6/6 he has not been having trouble voiding at home.  No dysuria.  No fevers or chills.  He is taking Macrobid.    Medications, past medical history, allergies, and social history have been reviewed and updated.    ROS:  See HPI      Objective:       Exam:  /68 (BP Location: Left arm, Patient Position: Sitting, BP Cuff Size: Adult)   Pulse 60   Temp 37 °C (98.6 °F)   Resp 12   Ht 1.727 m (5' 8\")   Wt 87.5 kg (193 lb)   SpO2 94%   BMI 29.35 kg/m²  Body mass index is 29.35 kg/m².    Constitutional: Alert. Well appearing. Using scooter for ambulation.  Skin: Warm, dry, good turgor, no visible rashes.  Eye: Equal, round and reactive to light, conjunctiva clear, lids normal.  ENMT: Moist mucous membranes.   Respiratory: Normal effort.   MSK: Boot in place to right foot  Prostate: Defers  Neuro: Moves all four extremities. No facial droop.  Psych: Answers questions appropriately. Normal affect and " mood.    Assessment & Plan:     25 y.o. male with the following -     1. Urinary retention  Recent urinary retention after right foot surgery.  He also has history of significant nocturia.  He has been able to void adequately since Fontana removal 6/6.  He has stopped opiate pain medications as well.  This may have just been due to general anesthesia and pain medications but given chronic symptoms and his young age I do think he needs urology consultation.  Continue Flomax for now.  Referral to urology placed.  - tamsulosin (FLOMAX) 0.4 MG capsule; Take 1 capsule by mouth every day for 30 days.  Dispense: 30 capsule; Refill: 1      Please note that this note was created using voice recognition software.

## 2025-08-05 ENCOUNTER — OFFICE VISIT (OUTPATIENT)
Dept: MEDICAL GROUP | Age: 30
End: 2025-08-05
Payer: COMMERCIAL

## 2025-08-05 VITALS
SYSTOLIC BLOOD PRESSURE: 118 MMHG | WEIGHT: 202.82 LBS | HEIGHT: 69 IN | BODY MASS INDEX: 30.04 KG/M2 | OXYGEN SATURATION: 98 % | DIASTOLIC BLOOD PRESSURE: 72 MMHG | HEART RATE: 58 BPM | TEMPERATURE: 98.2 F

## 2025-08-05 DIAGNOSIS — M50.20 CERVICAL DISC HERNIATION: ICD-10-CM

## 2025-08-05 DIAGNOSIS — G47.00 INSOMNIA, UNSPECIFIED TYPE: ICD-10-CM

## 2025-08-05 DIAGNOSIS — M54.2 NECK PAIN WITH HISTORY OF CERVICAL SPINAL SURGERY: ICD-10-CM

## 2025-08-05 DIAGNOSIS — Z00.00 BLOOD TESTS FOR ROUTINE GENERAL PHYSICAL EXAMINATION: Primary | ICD-10-CM

## 2025-08-05 DIAGNOSIS — Z98.890 NECK PAIN WITH HISTORY OF CERVICAL SPINAL SURGERY: ICD-10-CM

## 2025-08-05 DIAGNOSIS — Z91.89 ENCOUNTER FOR HCV SCREENING TEST FOR HIGH RISK PATIENT: ICD-10-CM

## 2025-08-05 DIAGNOSIS — Z11.4 SCREENING FOR HIV WITHOUT PRESENCE OF RISK FACTORS: ICD-10-CM

## 2025-08-05 DIAGNOSIS — Z11.59 ENCOUNTER FOR HCV SCREENING TEST FOR HIGH RISK PATIENT: ICD-10-CM

## 2025-08-05 PROCEDURE — 99204 OFFICE O/P NEW MOD 45 MIN: CPT | Performed by: STUDENT IN AN ORGANIZED HEALTH CARE EDUCATION/TRAINING PROGRAM

## 2025-08-05 PROCEDURE — 3074F SYST BP LT 130 MM HG: CPT | Performed by: STUDENT IN AN ORGANIZED HEALTH CARE EDUCATION/TRAINING PROGRAM

## 2025-08-05 PROCEDURE — 3078F DIAST BP <80 MM HG: CPT | Performed by: STUDENT IN AN ORGANIZED HEALTH CARE EDUCATION/TRAINING PROGRAM

## 2025-08-05 RX ORDER — METHOCARBAMOL 750 MG/1
750 TABLET, FILM COATED ORAL
COMMUNITY
Start: 2025-07-29

## 2025-08-05 ASSESSMENT — ENCOUNTER SYMPTOMS
NECK PAIN: 1
PALPITATIONS: 0
HEADACHES: 0
BACK PAIN: 0
DEPRESSION: 0
BLOOD IN STOOL: 0
SHORTNESS OF BREATH: 0
BLURRED VISION: 0
FEVER: 0
COUGH: 0
WEAKNESS: 0
DOUBLE VISION: 0
ABDOMINAL PAIN: 0
BRUISES/BLEEDS EASILY: 0
CHILLS: 0
PHOTOPHOBIA: 0
ORTHOPNEA: 0
DIZZINESS: 0

## 2025-08-05 ASSESSMENT — PATIENT HEALTH QUESTIONNAIRE - PHQ9: CLINICAL INTERPRETATION OF PHQ2 SCORE: 0

## 2025-08-09 ENCOUNTER — HOSPITAL ENCOUNTER (OUTPATIENT)
Dept: LAB | Facility: MEDICAL CENTER | Age: 30
End: 2025-08-09
Attending: STUDENT IN AN ORGANIZED HEALTH CARE EDUCATION/TRAINING PROGRAM
Payer: COMMERCIAL

## 2025-08-09 DIAGNOSIS — Z11.4 SCREENING FOR HIV WITHOUT PRESENCE OF RISK FACTORS: ICD-10-CM

## 2025-08-09 DIAGNOSIS — Z11.59 ENCOUNTER FOR HCV SCREENING TEST FOR HIGH RISK PATIENT: ICD-10-CM

## 2025-08-09 DIAGNOSIS — Z91.89 ENCOUNTER FOR HCV SCREENING TEST FOR HIGH RISK PATIENT: ICD-10-CM

## 2025-08-09 DIAGNOSIS — Z00.00 BLOOD TESTS FOR ROUTINE GENERAL PHYSICAL EXAMINATION: ICD-10-CM

## 2025-08-09 LAB
25(OH)D3 SERPL-MCNC: 57 NG/ML (ref 30–100)
ALBUMIN SERPL BCP-MCNC: 4.2 G/DL (ref 3.2–4.9)
ALBUMIN/GLOB SERPL: 2 G/DL
ALP SERPL-CCNC: 38 U/L (ref 30–99)
ALT SERPL-CCNC: 39 U/L (ref 2–50)
ANION GAP SERPL CALC-SCNC: 10 MMOL/L (ref 7–16)
AST SERPL-CCNC: 26 U/L (ref 12–45)
BASOPHILS # BLD AUTO: 1 % (ref 0–1.8)
BASOPHILS # BLD: 0.05 K/UL (ref 0–0.12)
BILIRUB SERPL-MCNC: 0.3 MG/DL (ref 0.1–1.5)
BUN SERPL-MCNC: 21 MG/DL (ref 8–22)
CALCIUM ALBUM COR SERPL-MCNC: 8.9 MG/DL (ref 8.5–10.5)
CALCIUM SERPL-MCNC: 9.1 MG/DL (ref 8.5–10.5)
CHLORIDE SERPL-SCNC: 109 MMOL/L (ref 96–112)
CHOLEST SERPL-MCNC: 177 MG/DL (ref 100–199)
CO2 SERPL-SCNC: 23 MMOL/L (ref 20–33)
CREAT SERPL-MCNC: 1.03 MG/DL (ref 0.5–1.4)
EOSINOPHIL # BLD AUTO: 0.42 K/UL (ref 0–0.51)
EOSINOPHIL NFR BLD: 8.3 % (ref 0–6.9)
ERYTHROCYTE [DISTWIDTH] IN BLOOD BY AUTOMATED COUNT: 44.3 FL (ref 35.9–50)
EST. AVERAGE GLUCOSE BLD GHB EST-MCNC: 97 MG/DL
GFR SERPLBLD CREATININE-BSD FMLA CKD-EPI: 100 ML/MIN/1.73 M 2
GLOBULIN SER CALC-MCNC: 2.1 G/DL (ref 1.9–3.5)
GLUCOSE SERPL-MCNC: 87 MG/DL (ref 65–99)
HBA1C MFR BLD: 5 % (ref 4–5.6)
HCT VFR BLD AUTO: 43.5 % (ref 42–52)
HCV AB SER QL: NORMAL
HDLC SERPL-MCNC: 48 MG/DL
HGB BLD-MCNC: 14.3 G/DL (ref 14–18)
HIV 1+2 AB+HIV1 P24 AG SERPL QL IA: NORMAL
IMM GRANULOCYTES # BLD AUTO: 0 K/UL (ref 0–0.11)
IMM GRANULOCYTES NFR BLD AUTO: 0 % (ref 0–0.9)
LDLC SERPL CALC-MCNC: 116 MG/DL
LYMPHOCYTES # BLD AUTO: 2.06 K/UL (ref 1–4.8)
LYMPHOCYTES NFR BLD: 40.8 % (ref 22–41)
MCH RBC QN AUTO: 29.9 PG (ref 27–33)
MCHC RBC AUTO-ENTMCNC: 32.9 G/DL (ref 32.3–36.5)
MCV RBC AUTO: 90.8 FL (ref 81.4–97.8)
MONOCYTES # BLD AUTO: 0.38 K/UL (ref 0–0.85)
MONOCYTES NFR BLD AUTO: 7.5 % (ref 0–13.4)
NEUTROPHILS # BLD AUTO: 2.14 K/UL (ref 1.82–7.42)
NEUTROPHILS NFR BLD: 42.4 % (ref 44–72)
NRBC # BLD AUTO: 0 K/UL
NRBC BLD-RTO: 0 /100 WBC (ref 0–0.2)
PLATELET # BLD AUTO: 171 K/UL (ref 164–446)
PMV BLD AUTO: 10.6 FL (ref 9–12.9)
POTASSIUM SERPL-SCNC: 4.2 MMOL/L (ref 3.6–5.5)
PROT SERPL-MCNC: 6.3 G/DL (ref 6–8.2)
RBC # BLD AUTO: 4.79 M/UL (ref 4.7–6.1)
SODIUM SERPL-SCNC: 142 MMOL/L (ref 135–145)
TRIGL SERPL-MCNC: 65 MG/DL (ref 0–149)
TSH SERPL-ACNC: 3.36 UIU/ML (ref 0.38–5.33)
WBC # BLD AUTO: 5.1 K/UL (ref 4.8–10.8)

## 2025-08-09 PROCEDURE — 86803 HEPATITIS C AB TEST: CPT

## 2025-08-09 PROCEDURE — 80061 LIPID PANEL: CPT

## 2025-08-09 PROCEDURE — 83036 HEMOGLOBIN GLYCOSYLATED A1C: CPT

## 2025-08-09 PROCEDURE — 80053 COMPREHEN METABOLIC PANEL: CPT

## 2025-08-09 PROCEDURE — 87389 HIV-1 AG W/HIV-1&-2 AB AG IA: CPT

## 2025-08-09 PROCEDURE — 84443 ASSAY THYROID STIM HORMONE: CPT

## 2025-08-09 PROCEDURE — 85025 COMPLETE CBC W/AUTO DIFF WBC: CPT

## 2025-08-09 PROCEDURE — 82306 VITAMIN D 25 HYDROXY: CPT

## 2025-08-09 PROCEDURE — 36415 COLL VENOUS BLD VENIPUNCTURE: CPT

## 2025-08-25 ENCOUNTER — OFFICE VISIT (OUTPATIENT)
Dept: MEDICAL GROUP | Age: 30
End: 2025-08-25
Payer: COMMERCIAL

## 2025-08-25 VITALS
BODY MASS INDEX: 31.1 KG/M2 | OXYGEN SATURATION: 95 % | HEART RATE: 71 BPM | TEMPERATURE: 98.8 F | HEIGHT: 69 IN | WEIGHT: 210 LBS | SYSTOLIC BLOOD PRESSURE: 112 MMHG | DIASTOLIC BLOOD PRESSURE: 72 MMHG

## 2025-08-25 DIAGNOSIS — Z98.890 NECK PAIN WITH HISTORY OF CERVICAL SPINAL SURGERY: ICD-10-CM

## 2025-08-25 DIAGNOSIS — M50.20 CERVICAL DISC HERNIATION: Primary | ICD-10-CM

## 2025-08-25 DIAGNOSIS — M54.2 NECK PAIN WITH HISTORY OF CERVICAL SPINAL SURGERY: ICD-10-CM

## 2025-08-25 DIAGNOSIS — E78.00 ELEVATED LDL CHOLESTEROL LEVEL: ICD-10-CM

## 2025-08-25 DIAGNOSIS — E66.9 OBESITY (BMI 30-39.9): ICD-10-CM

## 2025-08-25 PROCEDURE — 3078F DIAST BP <80 MM HG: CPT | Performed by: STUDENT IN AN ORGANIZED HEALTH CARE EDUCATION/TRAINING PROGRAM

## 2025-08-25 PROCEDURE — 99214 OFFICE O/P EST MOD 30 MIN: CPT | Performed by: STUDENT IN AN ORGANIZED HEALTH CARE EDUCATION/TRAINING PROGRAM

## 2025-08-25 PROCEDURE — 3074F SYST BP LT 130 MM HG: CPT | Performed by: STUDENT IN AN ORGANIZED HEALTH CARE EDUCATION/TRAINING PROGRAM

## 2025-08-25 RX ORDER — OXYCODONE AND ACETAMINOPHEN 7.5; 325 MG/1; MG/1
1 TABLET ORAL EVERY 4 HOURS PRN
Qty: 30 TABLET | Refills: 0 | Status: SHIPPED | OUTPATIENT
Start: 2025-08-25 | End: 2025-12-23

## 2025-08-25 ASSESSMENT — LIFESTYLE VARIABLES: SUBSTANCE_ABUSE: 0

## 2025-08-25 ASSESSMENT — FIBROSIS 4 INDEX: FIB4 SCORE: 0.73

## 2025-08-25 ASSESSMENT — ENCOUNTER SYMPTOMS
FEVER: 0
BACK PAIN: 0
SHORTNESS OF BREATH: 0
ORTHOPNEA: 0
DEPRESSION: 0
BLURRED VISION: 0
DOUBLE VISION: 0
PALPITATIONS: 0
WHEEZING: 0
COUGH: 0
DIZZINESS: 0
NECK PAIN: 1
ABDOMINAL PAIN: 0
HEADACHES: 0
CHILLS: 0
BLOOD IN STOOL: 0
WEAKNESS: 0
BRUISES/BLEEDS EASILY: 0

## (undated) DEVICE — TUBING CLEARLINK DUO-VENT - C-FLO (48EA/CA)

## (undated) DEVICE — Device

## (undated) DEVICE — BLADE SURGICAL #15 - (50/BX 3BX/CA)

## (undated) DEVICE — K-WIRE

## (undated) DEVICE — DRAPE LARGE 3 QUARTER - (20/CA)

## (undated) DEVICE — CLOSURE SKIN STRIP 1/2 X 4 IN - (STERI STRIP) (50/BX 4BX/CA)

## (undated) DEVICE — BLADE

## (undated) DEVICE — PADDING CAST 3 IN STERILE - 3 X 4 YDS (24EA/CA)

## (undated) DEVICE — SLEEVE, VASO, THIGH, MED

## (undated) DEVICE — HEAD HOLDER JUNIOR/ADULT

## (undated) DEVICE — ELECTRODE 850 FOAM ADHESIVE - HYDROGEL RADIOTRNSPRNT (50/PK)

## (undated) DEVICE — GLOVE SZ 7.5 LF PROTEXIS (50PR/BX)

## (undated) DEVICE — PROTECTOR ULNA NERVE - (36PR/CA)

## (undated) DEVICE — NEPTUNE 4 PORT MANIFOLD - (20/PK)

## (undated) DEVICE — GOWN SURGEONS X-LARGE - DISP. (30/CA)

## (undated) DEVICE — DRILL BIT

## (undated) DEVICE — LACTATED RINGERS INJ 1000 ML - (14EA/CA 60CA/PF)

## (undated) DEVICE — DRESSING 3X8 ADAPTIC GAUZE - NON-ADHERING (36/PK 6PK/BX)

## (undated) DEVICE — PADDING CAST 4 IN STERILE - 4 X 4 YDS (24/CA)

## (undated) DEVICE — SUCTION INSTRUMENT YANKAUER BULBOUS TIP W/O VENT (50EA/CA)

## (undated) DEVICE — GOWN WARMING STANDARD FLEX - (30/CA)

## (undated) DEVICE — WIRE, OLIVE 2.2MM

## (undated) DEVICE — RASP SMALL TEAR CROSS-CUT 5X11MM

## (undated) DEVICE — SET EXTENSION WITH 2 PORTS (48EA/CA) ***PART #2C8610 IS A SUBSTITUTE*****

## (undated) DEVICE — WIRE, GUIDE

## (undated) DEVICE — SUTURE 4-0 ETHILON FS-1 18 (36PK/BX)"

## (undated) DEVICE — NEEDLE NON SAFETY 25 GA X 1 1/2 IN HYPO (100EA/BX)

## (undated) DEVICE — CHLORAPREP 26 ML APPLICATOR - ORANGE TINT(25/CA)

## (undated) DEVICE — MASK ANESTHESIA ADULT  - (100/CA)

## (undated) DEVICE — BANDAGE ELASTIC 4 HONEYCOMB - 4"X5YD LF (20/CA)"

## (undated) DEVICE — SUTURE 3-0 VICRYL PLUS SH - 8X 18 INCH (12/BX)

## (undated) DEVICE — SUTURE 3-0 ETHILON PS-1 (36PK/BX)

## (undated) DEVICE — SUTURE GENERAL

## (undated) DEVICE — SUTURE 4-0 VICRYL PLUS FS-2 - 27 INCH (36/BX)

## (undated) DEVICE — SUTURE 4-0 VICRYL PLUSFS-1 - 27 INCH (36/BX)

## (undated) DEVICE — WRAP CO-FLEX 4IN X 5YD STERIL - SELF-ADHERENT (18/CA)

## (undated) DEVICE — PIN, HALF

## (undated) DEVICE — ELECTRODE DUAL RETURN W/ CORD - (50/PK)

## (undated) DEVICE — TOURNIQUET CUFF 18 X 3 ONE PORT DISP - STERILE (10/BX)

## (undated) DEVICE — DRESSING ABDOMINAL PAD STERILE 8 X 10" (360EA/CA)"

## (undated) DEVICE — DRAPE 36X28IN RAD CARM BND BG - (25/CA) O

## (undated) DEVICE — PAD PREP 24 X 48 CUFFED - (100/CA)

## (undated) DEVICE — BLADE MICRO SAW 25MM X 5.5MM

## (undated) DEVICE — KIT ANESTHESIA W/CIRCUIT & 3/LT BAG W/FILTER (20EA/CA)

## (undated) DEVICE — SODIUM CHL IRRIGATION 0.9% 1000ML (12EA/CA)

## (undated) DEVICE — DRIVER

## (undated) DEVICE — GLOVE 7.0 LF PF PROTEXIS (50PR/BX)

## (undated) DEVICE — BLADE OSCILLATING 9MMX24.6MMX0.4MM LIKE STRYKER 2296-3-111

## (undated) DEVICE — CANISTER SUCTION 3000ML MECHANICAL FILTER AUTO SHUTOFF MEDI-VAC NONSTERILE LF DISP  (40EA/CA)

## (undated) DEVICE — TOWEL STOP TIMEOUT SAFETY FLAG (40EA/CA)

## (undated) DEVICE — SENSOR SPO2 NEO LNCS ADHESIVE (20/BX) SEE USER NOTES

## (undated) DEVICE — PACK LOWER EXTREMITY - (2/CA)